# Patient Record
Sex: MALE | Race: WHITE | Employment: OTHER | URBAN - METROPOLITAN AREA
[De-identification: names, ages, dates, MRNs, and addresses within clinical notes are randomized per-mention and may not be internally consistent; named-entity substitution may affect disease eponyms.]

---

## 2017-01-03 ENCOUNTER — HOSPITAL ENCOUNTER (OUTPATIENT)
Dept: PHYSICAL THERAPY | Age: 72
Discharge: HOME | End: 2017-01-03
Payer: MEDICARE

## 2017-01-03 PROCEDURE — 97110 THERAPEUTIC EXERCISES: CPT

## 2017-01-03 PROCEDURE — 97112 NEUROMUSCULAR REEDUCATION: CPT

## 2017-01-06 ENCOUNTER — HOSPITAL ENCOUNTER (OUTPATIENT)
Dept: PHYSICAL THERAPY | Age: 72
Discharge: HOME | End: 2017-01-06
Payer: MEDICARE

## 2017-01-06 PROCEDURE — 97112 NEUROMUSCULAR REEDUCATION: CPT

## 2017-01-06 PROCEDURE — G8978 MOBILITY CURRENT STATUS: HCPCS

## 2017-01-06 PROCEDURE — G8979 MOBILITY GOAL STATUS: HCPCS

## 2017-01-06 PROCEDURE — 97110 THERAPEUTIC EXERCISES: CPT

## 2017-01-06 NOTE — PROGRESS NOTES
[facility]  [address]  [place], [address]  [phone] fax    Medicare Progress Report    Patient name: Rosales Burnett. Start of Care: 2016   Referral source: Sunil Stout MD : 1945   Medical/Treatment Diagnosis: Low back pain [M54.5]  Sciatica [M54.30] Onset Date:2016     Prior Hospitalization: see medical history Provider#: [id number]   Medications: Verified on Patient Summary List    Comorbidities: Visual impairments, HBP, heart disease  Prior Level of Function: Recreational walking and biking without pain  Visits from Start of Care: 8    Missed Visits: 0    Reporting Period: 2016 to 2016    Subjective Reports: \"Its doing better, but I still get a lot of pain in that hamstring (points to Right). It got me when I woke up this morning. \"    Short Term Goals: To be accomplished in 1 weeks:  1. Pt will report compliance with HEP for self-management of symptoms. -met per patient report. 16  Long Term Goals: To be accomplished in 4 weeks:  1. Pt will improve FOTO score to 60 to improve ease of ADLs. Met at 64 2016  2. Pt will demonstrate 90/90 HS tests B to less than 10 degrees to improve ease of dressing. (B) HS at popliteal angle: 27 deg (16); Not met 27/30 R/L 2017  3. Pt will improve B glut med and glut max strength to 4+/5 or better to improve ease of descending stairs. Progressed 4/5 MMT, 4+/5 MMT 2017  4. Pt will demonstrate proper TrA draw with PPT x10 independently to improve core strength and stability for ease of ADLs.  Met, 10\"x10 2016    Key functional changes: Hip ABD: R 4/5 MMT, L 4+/5 MMT  Hip extension: 4+/5 MMT Jacky     Decreased stability in SLS and SLS activity through R LE.  Added dynamic step ups.     90/90 HS: 30 degrees L, 27 R      Problems/ barriers to goal attainment: age, HEP compliance     Assessment / Recommendations: Pt progressing with stabilization interventions, but continues to demonstrate limited hip stability despite gains in hip strength and LE flexibility. Subjectively he reports improvement, but continues to report intermittent post R hamstring pain. Recommend continuing PT to address remaining impairments. Problem List: pain affecting function, decrease ROM, decrease strength, decrease ADL/ functional abilitiies, decrease activity tolerance and decrease flexibility/ joint mobility   Treatment Plan: Therapeutic exercise, Therapeutic activities, Neuromuscular re-education, Physical agent/modality, Manual therapy, Aquatic therapy, Patient education and Self Care training    Patient Goal (s) has been updated and includes: Regain confidence in leg's strength. Ride bike again     Updated Goals to be accomplished in 2 weeks:  Continue unmet goals    Frequency / Duration: Patient to be seen 2 times per week for 2 weeks:    G-Codes (GP)  Mobility  [id number] Current  CJ= 20-39%   Goal  CK= 40-59%    The severity rating is based on clinical judgment and the FOTO score.       Alena Gtz, PT, DPT, ATC, CSCS 1/6/2017 11:48 AM

## 2017-01-06 NOTE — PROGRESS NOTES
PT DAILY TREATMENT NOTE - [facility]     Patient Name: Marietta Stockton. Date:2017  : 1945  [x]  Patient  Verified  Payor: [place] MEDICARE / Plan: [place] MEDICARE PART A & B / Product Type: Medicare /    In time:9:30am  Out time:10:15am  Total Treatment Time (min): 45  Total Timed Codes (min): 45  1:1 Treatment Time ( [address] only): 44   Visit #: 8 of 8    Treatment Area: Low back pain [M54.5]  Sciatica [M54.30]    SUBJECTIVE  Pain Level (0-10 scale): 0  Any medication changes, allergies to medications, adverse drug reactions, diagnosis change, or new procedure performed?: [x] No    [] Yes (see summary sheet for update)  Subjective functional status/changes:   [] No changes reported  \"Its doing better, but I still get a lot of pain in that hamstring (points to Right). It got me when I woke up this morning. \"    OBJECTIVE    20 min Therapeutic Exercise:  [] See flow sheet :   Rationale: increase ROM and increase strength to improve the patients ability to improve ADL ease. 25 min Neuromuscular Re-education:  []  See flow sheet :   Rationale: increase strength, improve coordination and increase proprioception  to improve the patients ability to improve core stabilization during functional activity. With   [] TE   [] TA   [] neuro   [] other: Patient Education: [x] Review HEP    [] Progressed/Changed HEP based on:   [] positioning   [] body mechanics   [] transfers   [] heat/ice application    [] other:      Other Objective/Functional Measures:     Required frequent tactile cues to facilitate hip and knee flexion and avoid trunk compensation during lunges    Cues to reduce post pelvic tilt during seated chair interventions     Pain Level (0-10 scale) post treatment: 0    ASSESSMENT/Changes in Function: Pt progressing with stabilization interventions, but continues to demonstrate limited hip stability despite gains in hip strength and LE flexibility.  Subjectively he reports improvement, but continues to report intermittent post R hamstring pain. Recommend continuing PT to address remaining impairments. Patient will continue to benefit from skilled PT services to modify and progress therapeutic interventions, address functional mobility deficits, address ROM deficits, address strength deficits, analyze and address soft tissue restrictions, analyze and cue movement patterns, analyze and modify body mechanics/ergonomics, assess and modify postural abnormalities and instruct in home and community integration to attain remaining goals. []  See Plan of Care  [x]  See progress note/recertification  []  See Discharge Summary         Progress towards goals / Updated goals:  Short Term Goals: To be accomplished in 1 weeks:  1. Pt will report compliance with HEP for self-management of symptoms. -met per patient report. 12/14/16  Long Term Goals: To be accomplished in 4 weeks:  1. Pt will improve FOTO score to 60 to improve ease of ADLs. Met at 64 12/30/2016  2. Pt will demonstrate 90/90 HS tests B to less than 10 degrees to improve ease of dressing. (B) HS at popliteal angle: 27 deg (12/16/16); Not met 27/30 R/L 1/03/2017  3. Pt will improve B glut med and glut max strength to 4+/5 or better to improve ease of descending stairs. Progressed 4/5 MMT, 4+/5 MMT 1/03/2017  4. Pt will demonstrate proper TrA draw with PPT x10 independently to improve core strength and stability for ease of ADLs.   Met, 10\"x10 12/21/2016    PLAN  []  Upgrade activities as tolerated     [x]  Continue plan of care  []  Update interventions per flow sheet       []  Discharge due to:_  []  Other:_      Magdalena Webb, PT, DPT, ATC, CSCS 1/6/2017  9:52 AM    Future Appointments  Date Time Provider Jesús Casper   1/9/2017 2:30 PM Lindy Howard, PT [facility]   1/12/2017 8:30 AM Elizabeth Solis, PT [facility]   1/23/2017 8:30 AM Lindy Howard, PT [facility]   1/25/2017 8:30 AM Lindy Howard, PT [facility]

## 2017-01-09 ENCOUNTER — APPOINTMENT (OUTPATIENT)
Dept: PHYSICAL THERAPY | Age: 72
End: 2017-01-09
Payer: MEDICARE

## 2017-01-12 ENCOUNTER — HOSPITAL ENCOUNTER (OUTPATIENT)
Dept: PHYSICAL THERAPY | Age: 72
Discharge: HOME | End: 2017-01-12
Payer: MEDICARE

## 2017-01-12 PROCEDURE — 97112 NEUROMUSCULAR REEDUCATION: CPT

## 2017-01-12 PROCEDURE — 97110 THERAPEUTIC EXERCISES: CPT

## 2017-01-12 NOTE — PROGRESS NOTES
PT DAILY TREATMENT NOTE - [facility] 3-16    Patient Name: Diana Grover. Date:2017  : 1945  [x]  Patient  Verified  Payor: [place] / Plan: [place] MEDICARE PART A & B / Product Type: Medicare /    In [id number]  Out time:09  Total Treatment Time (min): 36  Total Timed Codes (min): 36  1:1 Treatment Time ( only): 24   Visit #: 1 of 4    Treatment Area: Low back pain [M54.5]  Sciatica [M54.30]    SUBJECTIVE  Pain Level (0-10 scale): 0  Any medication changes, allergies to medications, adverse drug reactions, diagnosis change, or new procedure performed?: [x] No    [] Yes (see summary sheet for update)  Subjective functional status/changes:   [] No changes reported  Pt reports seeing his MD, who prescribed something for nerve pain that he has not started taking. He reports his sciatica is gone, but his MD believes he has pain coming from a nerve in his lower back. OBJECTIVE     min []Eval                  []Re-Eval     10 min Therapeutic Exercise:  [x] See flow sheet :   Rationale: increase ROM and increase strength to improve the patients ability to perform ADLs    26 min Neuromuscular Re-education:  [x]  See flow sheet :   Rationale: increase strength, improve coordination and increase proprioception  to improve the patients ability to improve stability     With   [] TE   [] TA   [] neuro   [] other: Patient Education: [x] Review HEP    [] Progressed/Changed HEP based on:   [] positioning   [] body mechanics   [] transfers   [] heat/ice application    [] other:      Other Objective/Functional Measures: difficulty with BOSU lunge B, as well as with cyndi marches    Pain Level (0-10 scale) post treatment: 0    ASSESSMENT/Changes in Function:   Pt reports feeling more weakness in his R LE, demonstrating more shaking/fatigue with standing exercises.   He reports sciatic nerve symptoms have abolished, but that his doctor believes he still has a nerve issue from his lower back due to reflex testing in B LE.    Patient will continue to benefit from skilled PT services to modify and progress therapeutic interventions, address functional mobility deficits, address ROM deficits, address strength deficits, analyze and address soft tissue restrictions, analyze and cue movement patterns, analyze and modify body mechanics/ergonomics, assess and modify postural abnormalities and instruct in home and community integration to attain remaining goals. []  See Plan of Care  []  See progress note/recertification  []  See Discharge Summary         Progress towards goals / Updated goals:  Updated Goals to be accomplished in 2 weeks:  1. Pt will demonstrate 90/90 HS tests B to less than 10 degrees to improve ease of dressing. Not met, still very restricted 01/12/2017  2. Pt will improve B glut med and glut max strength to 4+/5 or better to improve ease of descending stairs.   Fatigues more quickly on R 01/12/2017    PLAN  [x]  Upgrade activities as tolerated     [x]  Continue plan of care  [x]  Update interventions per flow sheet       []  Discharge due to:_  []  Other:_      Elizabeth Solis, PT 1/12/2017  8:46 AM    Future Appointments  Date Time Provider Jesús Casper   1/23/2017 8:30 AM [facility]   1/25/2017 8:30 AM [facility]

## 2017-01-25 ENCOUNTER — HOSPITAL ENCOUNTER (OUTPATIENT)
Dept: PHYSICAL THERAPY | Age: 72
Discharge: HOME | End: 2017-01-25
Payer: MEDICARE

## 2017-01-25 PROCEDURE — 97112 NEUROMUSCULAR REEDUCATION: CPT

## 2017-01-25 PROCEDURE — 97110 THERAPEUTIC EXERCISES: CPT

## 2017-01-25 NOTE — PROGRESS NOTES
PT DAILY TREATMENT NOTE - [facility]     Patient Name: Jt Maher. Date:2017  : 1945  [x]  Patient  Verified  Payor: Neil Dear / Plan: [place] MEDICARE PART A & B / Product Type: Medicare /    In time:8:33  Out time:9:17  Total Treatment Time (min): 44  Total Timed Codes (min): 44  1:1 Treatment Time ([address] only): 40   Visit #: 2 of 4    Treatment Area: Low back pain [M54.5]  Sciatica [M54.30]    SUBJECTIVE  Pain Level (0-10 scale): 0/10  Any medication changes, allergies to medications, adverse drug reactions, diagnosis change, or new procedure performed?: [x] No    [] Yes (see summary sheet for update)  Subjective functional status/changes:   [] No changes reported  The patient states that he was carrying groceries up the stairs yesterday and noted feeling weaker through his right leg. No pain reported. OBJECTIVE   34 min Therapeutic Exercise:  [x] See flow sheet :   Rationale: increase ROM and increase strength to improve the patients ability to improve ADL ease. 10 min Neuromuscular Re-education:  [x]  See flow sheet : Bosu lunges, bosu step ups, pistol squats R LE. Rationale: increase ROM and increase strength  to improve the patients ability to improve ADL ease. With   [] TE   [] TA   [] neuro   [] other: Patient Education: [x] Review HEP    [] Progressed/Changed HEP based on:   [] positioning   [] body mechanics   [] transfers   [] heat/ice application    [] other:      Other Objective/Functional Measures: Added pistol squats with the patient requiring cues in order to perform correctly (nose over toes, with momentum recruitment). Cues for decreasing weight shift during quadruped. Pain Level (0-10 scale) post treatment: 0/10     ASSESSMENT/Changes in Function: Good progress with strengthening exercises. 1 additional visit with progression to D/C to HEP next visit.      Patient will continue to benefit from skilled PT services to modify and progress therapeutic interventions, address functional mobility deficits, address ROM deficits, address strength deficits, analyze and address soft tissue restrictions, analyze and cue movement patterns, analyze and modify body mechanics/ergonomics, assess and modify postural abnormalities and instruct in home and community integration to attain remaining goals. []  See Plan of Care  []  See progress note/recertification  []  See Discharge Summary         Progress towards goals / Updated goals:  Updated Goals to be accomplished in 2 weeks:  1. Pt will demonstrate 90/90 HS tests B to less than 10 degrees to improve ease of dressing. Not met, still very restricted 01/12/2017; Not met, but good progress with mid 20's 90/90 1/25/2017.   2. Pt will improve B glut med and glut max strength to 4+/5 or better to improve ease of descending stairs. Fatigues more quickly on R 01/12/2017    PLAN  []  Upgrade activities as tolerated     [x]  Continue plan of care    Wendy Holloway, PT 1/25/2017  8:42 AM    No future appointments.

## 2017-01-31 ENCOUNTER — HOSPITAL ENCOUNTER (OUTPATIENT)
Dept: PHYSICAL THERAPY | Age: 72
Discharge: HOME | End: 2017-01-31
Payer: MEDICARE

## 2017-01-31 PROCEDURE — 97112 NEUROMUSCULAR REEDUCATION: CPT

## 2017-01-31 PROCEDURE — G8980 MOBILITY D/C STATUS: HCPCS

## 2017-01-31 PROCEDURE — 97110 THERAPEUTIC EXERCISES: CPT

## 2017-01-31 PROCEDURE — G8979 MOBILITY GOAL STATUS: HCPCS

## 2017-01-31 NOTE — PROGRESS NOTES
PT DAILY TREATMENT NOTE - [facility]     Patient Name: Tristian Holt. Date:2017  : 1945  [x]  Patient  Verified  Payor: Michael Veliz / Plan: [place] MEDICARE PART A & B / Product Type: Medicare /    In time:9:00  Out time:9:50  Total Treatment Time (min): 50  Total Timed Codes (min): 50  1:1 Treatment Time ( only): 25  Visit #: 3 of 4     Treatment Area: Low back pain [M54.5]  Sciatica [M54.30]     SUBJECTIVE  Pain Level (0-10 scale): 0/10  Any medication changes, allergies to medications, adverse drug reactions, diagnosis change, or new procedure performed?: [x] No    [] Yes (see summary sheet for update)  Subjective functional status/changes:   [] No changes reported  The patient states he does feel like he is doing better and that he improved with Physical Therapy. OBJECTIVE  40 min Therapeutic Exercise:  [x] See flow sheet :   Rationale: increase ROM and increase strength to improve the patients ability to improve ADL ease. 10 min Neuromuscular Re-education:  [x]  See flow sheet :    Rationale: increase ROM and increase strength  to improve the patients ability to improve ADL ease. With   [] TE   [] TA   [] neuro   [] other: Patient Education: [x] Review HEP    [] Progressed/Changed HEP based on:   [] positioning   [] body mechanics   [] transfers   [] heat/ice application    [] other:      Other Objective/Functional Measures:   Hip extension: 4/5 MMT      Pain Level (0-10 scale) post treatment: 0/10    ASSESSMENT/Changes in Function: Good progress with PT. He has reached plateau with PT, noted improvements with hip strength. His HEP has been updated and he has been discharged to [facility]. []  See Plan of Care  []  See progress note/recertification  [x]  See Discharge Summary         Progress towards goals / Updated goals:  Updated Goals to be accomplished in 2 weeks:  1. Pt will demonstrate 90/90 HS tests B to less than 10 degrees to improve ease of dressing.  Not met, still very restricted 01/12/2017; Not met, but good progress with mid 20's 90/90 1/25/2017.   2. Pt will improve B glut med and glut max strength to 4+/5 or better to improve ease of descending stairs. Fatigues more quickly on R 01/12/2017; Progressed to 4/5 MMT 1/31/2017    PLAN  []  Upgrade activities as tolerated     []  Continue plan of care  []  Update interventions per flow sheet       [x]  Discharge due to:_  []  Other:_      Alayne Osler, PT 1/31/2017  10:39 AM    No future appointments.

## 2017-02-02 NOTE — PROGRESS NOTES
[facility]  [address]  [place], [address]  [phone] fax    Physical Therapy Discharge Summary    Patient name: Maliha Alejandre. Start of Care: 2016   Referral source: Richi Moffett MD : 1945   Medical/Treatment Diagnosis: Low back pain [M54.5]  Sciatica [M54.30] Onset Date:2016   Prior Hospitalization: see medical history Provider#: [id number]   Medications: Verified on Patient Summary List     Comorbidities: Visual impairments, HBP, heart disease  Prior Level of Function: Recreational walking and biking without pain  Visits from Start of Care: 11    Missed Visits: 0  Reporting Period : 2017 to 2017    Summary of Care:  1. Pt will demonstrate 90/90 HS tests B to less than 10 degrees to improve ease of dressing. Not met, still very restricted 2017; Not met, but good progress with mid 20's 90/90 2017.   2. Pt will improve B glut med and glut max strength to 4+/5 or better to improve ease of descending stairs. Fatigues more quickly on R 2017; Progressed to 4/5 MMT 2017    Good progress with PT. He has reached plateau with PT, noted improvements with hip strength. His HEP has been updated and he has been discharged to [facility]. G-Codes (GP)  Mobility   [id number] Goal  CK= 40-59%  D/C  CJ= 20-39%    The severity rating is based on clinical judgment and the FOTO score.     ASSESSMENT/RECOMMENDATIONS:  [x]Discontinue therapy: [x]Patient has reached or is progressing toward set goals      []Patient is non-compliant or has abdicated      []Due to lack of appreciable progress towards set [address], PT 2017 6:35 PM

## 2017-02-08 ENCOUNTER — HOSPITAL ENCOUNTER (OUTPATIENT)
Age: 72
Discharge: HOME | End: 2017-02-08
Attending: INTERNAL MEDICINE
Payer: MEDICARE

## 2017-02-08 DIAGNOSIS — M79.604 PAIN OF RIGHT LEG: ICD-10-CM

## 2017-02-08 PROCEDURE — 72148 MRI LUMBAR SPINE W/O DYE: CPT

## 2017-02-14 ENCOUNTER — OFFICE VISIT (OUTPATIENT)
Dept: ORTHOPEDIC SURGERY | Age: 72
End: 2017-02-14

## 2017-02-14 VITALS
WEIGHT: 219 LBS | TEMPERATURE: 97.8 F | HEART RATE: 75 BPM | DIASTOLIC BLOOD PRESSURE: 76 MMHG | BODY MASS INDEX: 30.66 KG/M2 | OXYGEN SATURATION: 98 % | SYSTOLIC BLOOD PRESSURE: 144 MMHG | HEIGHT: 71 IN | RESPIRATION RATE: 18 BRPM

## 2017-02-14 DIAGNOSIS — M79.2 NEURITIS: ICD-10-CM

## 2017-02-14 DIAGNOSIS — N28.1 BILATERAL RENAL CYSTS: ICD-10-CM

## 2017-02-14 DIAGNOSIS — M47.816 LUMBAR FACET ARTHROPATHY: ICD-10-CM

## 2017-02-14 DIAGNOSIS — M48.061 LUMBAR SPINAL STENOSIS: Primary | ICD-10-CM

## 2017-02-14 RX ORDER — GLUCOSAM/CHONDRO/HERB 149/HYAL 750-100 MG
2000 TABLET ORAL
COMMUNITY
End: 2018-11-13

## 2017-02-14 RX ORDER — GLUCOSAMINE SULFATE 1500 MG
POWDER IN PACKET (EA) ORAL DAILY
COMMUNITY
End: 2021-05-11

## 2017-02-14 RX ORDER — LOSARTAN POTASSIUM 100 MG/1
TABLET ORAL
COMMUNITY
Start: 2016-12-22 | End: 2022-03-01 | Stop reason: SDUPTHER

## 2017-02-14 RX ORDER — ROSUVASTATIN CALCIUM 10 MG/1
10 TABLET, FILM COATED ORAL DAILY
COMMUNITY
Start: 2016-12-01 | End: 2021-08-30 | Stop reason: SDUPTHER

## 2017-02-14 RX ORDER — GABAPENTIN 100 MG/1
100 CAPSULE ORAL
COMMUNITY
Start: 2017-01-11 | End: 2019-11-26

## 2017-02-14 RX ORDER — BISMUTH SUBSALICYLATE 262 MG
1 TABLET,CHEWABLE ORAL DAILY
COMMUNITY
End: 2018-11-13

## 2017-02-14 NOTE — PATIENT INSTRUCTIONS
Low Back Arthritis: Exercises  Your Care Instructions  Here are some examples of typical rehabilitation exercises for your condition. Start each exercise slowly. Ease off the exercise if you start to have pain. Your doctor or physical therapist will tell you when you can start these exercises and which ones will work best for you. When you are not being active, find a comfortable position for rest. Some people are comfortable on the floor or a medium-firm bed with a small pillow under their head and another under their knees. Some people prefer to lie on their side with a pillow between their knees. Don't stay in one position for too long. Take short walks (10 to 20 minutes) every 2 to 3 hours. Avoid slopes, hills, and stairs until you feel better. Walk only distances you can manage without pain, especially leg pain. How to do the exercises  Pelvic tilt    1. Lie on your back with your knees bent. 2. \"Brace\" your stomach--tighten your muscles by pulling in and imagining your belly button moving toward your spine. 3. Press your lower back into the floor. You should feel your hips and pelvis rock back. 4. Hold for 6 seconds while breathing smoothly. 5. Relax and allow your pelvis and hips to rock forward. 6. Repeat 8 to 12 times. Back stretches    1. Get down on your hands and knees on the floor. 2. Relax your head and allow it to droop. Round your back up toward the ceiling until you feel a nice stretch in your upper, middle, and lower back. Hold this stretch for as long as it feels comfortable, or about 15 to 30 seconds. 3. Return to the starting position with a flat back while you are on your hands and knees. 4. Let your back sway by pressing your stomach toward the floor. Lift your buttocks toward the ceiling. 5. Hold this position for 15 to 30 seconds. 6. Repeat 2 to 4 times. Follow-up care is a key part of your treatment and safety.  Be sure to make and go to all appointments, and call your doctor if you are having problems. It's also a good idea to know your test results and keep a list of the medicines you take. Where can you learn more? Go to http://ty-cyrus.info/. Enter O876 in the search box to learn more about \"Low Back Arthritis: Exercises. \"  Current as of: May 23, 2016  Content Version: 11.1  © 1643-6134 [facility]. Care instructions adapted under license by [employer] (which disclaims liability or warranty for this information). If you have questions about a medical condition or this instruction, always ask your healthcare professional. [address] any warranty or liability for your use of this information.
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
## 2018-07-19 ENCOUNTER — HOSPITAL ENCOUNTER (OUTPATIENT)
Dept: LAB | Age: 73
Discharge: HOME | End: 2018-07-19
Payer: MEDICARE

## 2018-07-19 LAB
ALBUMIN SERPL-MCNC: 3.6 G/DL (ref 3.4–5)
ALBUMIN/GLOB SERPL: 1.1 {RATIO} (ref 0.8–1.7)
ALP SERPL-CCNC: 69 U/L (ref 45–117)
ALT SERPL-CCNC: 23 U/L (ref 16–61)
ANION GAP SERPL CALC-SCNC: 7 MMOL/L (ref 3–18)
APPEARANCE UR: CLEAR
AST SERPL-CCNC: 14 U/L (ref 15–37)
BASOPHILS # BLD: 0 K/UL (ref 0–0.1)
BASOPHILS NFR BLD: 1 % (ref 0–2)
BILIRUB SERPL-MCNC: 0.5 MG/DL (ref 0.2–1)
BILIRUB UR QL: NEGATIVE
BUN SERPL-MCNC: 18 MG/DL (ref 7–18)
BUN/CREAT SERPL: 17 (ref 12–20)
CALCIUM SERPL-MCNC: 8 MG/DL (ref 8.5–10.1)
CHLORIDE SERPL-SCNC: 105 MMOL/L (ref 100–108)
CHOLEST SERPL-MCNC: 167 MG/DL
CO2 SERPL-SCNC: 29 MMOL/L (ref 21–32)
COLOR UR: YELLOW
CREAT SERPL-MCNC: 1.03 MG/DL (ref 0.6–1.3)
DIFFERENTIAL METHOD BLD: ABNORMAL
EOSINOPHIL # BLD: 0.5 K/UL (ref 0–0.4)
EOSINOPHIL NFR BLD: 12 % (ref 0–5)
EPITH CASTS URNS QL MICRO: NEGATIVE /LPF (ref 0–5)
ERYTHROCYTE [DISTWIDTH] IN BLOOD BY AUTOMATED COUNT: 12.3 % (ref 11.6–14.5)
GLOBULIN SER CALC-MCNC: 3.2 G/DL (ref 2–4)
GLUCOSE SERPL-MCNC: 102 MG/DL (ref 74–99)
GLUCOSE UR STRIP.AUTO-MCNC: NEGATIVE MG/DL
HCT VFR BLD AUTO: 40.3 % (ref 36–48)
HDLC SERPL-MCNC: 84 MG/DL (ref 40–60)
HDLC SERPL: 2 {RATIO} (ref 0–5)
HGB BLD-MCNC: 14.2 G/DL (ref 13–16)
HGB UR QL STRIP: ABNORMAL
KETONES UR QL STRIP.AUTO: NEGATIVE MG/DL
LDLC SERPL CALC-MCNC: 60.2 MG/DL (ref 0–100)
LEUKOCYTE ESTERASE UR QL STRIP.AUTO: ABNORMAL
LIPID PROFILE,FLP: ABNORMAL
LYMPHOCYTES # BLD: 1.3 K/UL (ref 0.9–3.6)
LYMPHOCYTES NFR BLD: 30 % (ref 21–52)
MCH RBC QN AUTO: 32.5 PG (ref 24–34)
MCHC RBC AUTO-ENTMCNC: 35.2 G/DL (ref 31–37)
MCV RBC AUTO: 92.2 FL (ref 74–97)
MONOCYTES # BLD: 0.4 K/UL (ref 0.05–1.2)
MONOCYTES NFR BLD: 10 % (ref 3–10)
NEUTS SEG # BLD: 2 K/UL (ref 1.8–8)
NEUTS SEG NFR BLD: 47 % (ref 40–73)
NITRITE UR QL STRIP.AUTO: NEGATIVE
PH UR STRIP: 5 [PH] (ref 5–8)
PLATELET # BLD AUTO: 180 K/UL (ref 135–420)
PMV BLD AUTO: 10.1 FL (ref 9.2–11.8)
POTASSIUM SERPL-SCNC: 4.2 MMOL/L (ref 3.5–5.5)
PROT SERPL-MCNC: 6.8 G/DL (ref 6.4–8.2)
PROT UR STRIP-MCNC: NEGATIVE MG/DL
PSA SERPL-MCNC: 1.6 NG/ML (ref 0–4)
RBC # BLD AUTO: 4.37 M/UL (ref 4.7–5.5)
RBC #/AREA URNS HPF: NORMAL /HPF (ref 0–5)
SODIUM SERPL-SCNC: 141 MMOL/L (ref 136–145)
SP GR UR REFRACTOMETRY: 1.02 (ref 1–1.03)
TRIGL SERPL-MCNC: 114 MG/DL (ref ?–150)
UROBILINOGEN UR QL STRIP.AUTO: 1 EU/DL (ref 0.2–1)
VLDLC SERPL CALC-MCNC: 22.8 MG/DL
WBC # BLD AUTO: 4.2 K/UL (ref 4.6–13.2)
WBC URNS QL MICRO: NORMAL /HPF (ref 0–4)

## 2018-07-19 PROCEDURE — 81001 URINALYSIS AUTO W/SCOPE: CPT | Performed by: INTERNAL MEDICINE

## 2018-07-19 PROCEDURE — 80053 COMPREHEN METABOLIC PANEL: CPT | Performed by: INTERNAL MEDICINE

## 2018-07-19 PROCEDURE — 85025 COMPLETE CBC W/AUTO DIFF WBC: CPT | Performed by: INTERNAL MEDICINE

## 2018-07-19 PROCEDURE — 80061 LIPID PANEL: CPT | Performed by: INTERNAL MEDICINE

## 2018-07-19 PROCEDURE — 36415 COLL VENOUS BLD VENIPUNCTURE: CPT | Performed by: INTERNAL MEDICINE

## 2018-07-19 PROCEDURE — 84153 ASSAY OF PSA TOTAL: CPT | Performed by: INTERNAL MEDICINE

## 2018-08-15 ENCOUNTER — NURSE NAVIGATOR (OUTPATIENT)
Dept: OTHER | Age: 73
End: 2018-08-15

## 2018-08-15 NOTE — NURSE NAVIGATOR
Referring Provider: Giselle Valle MD      Lung Cancer Risk Profile:   Age: 75  Gender: Male  Height: 70.5\"  Weight: 210#    Smoking History:  Smoking Status: past use  # years smokin years smoking a pipe  # years quit: 25  Packs/day: N/A  Pack years: N/A    Patient discussed smoking cessation with PCP: Unknown    Patient participated in shared decision making process with PCP: Unknown    Patient is currently experiencing symptoms: No, per patient report    If yes what symptoms:     Co-Morbidities:      Cancer History:  Basal cell    Additional Risk Factors:    Exposure to asbestos and agent orange      Pt does not meet screening criteria because he did not smoke cigarettes and he quit pipe smoking 25 years ago (established criteria states must be current smoker or have quit within 15 years). Pt notified that he does not meet criteria, he has been advised of the $199 self-pay option and he does want to self-pay. Mr. Андрей Oshea is aware that payment will be due at the time of the procedure. Dori Jones, MAIKELN, RN, [id number] [facility] Nurse Navigator

## 2018-08-16 ENCOUNTER — HOSPITAL ENCOUNTER (OUTPATIENT)
Dept: CT IMAGING | Age: 73
Discharge: HOME | End: 2018-08-16
Attending: INTERNAL MEDICINE
Payer: SELF-PAY

## 2018-08-16 VITALS — BODY MASS INDEX: 30.35 KG/M2 | HEIGHT: 70 IN | WEIGHT: 212 LBS

## 2018-08-16 DIAGNOSIS — Z87.891 PERSONAL HISTORY OF TOBACCO USE, PRESENTING HAZARDS TO HEALTH: ICD-10-CM

## 2018-08-16 PROCEDURE — G0297 LDCT FOR LUNG CA SCREEN: HCPCS

## 2018-08-27 PROBLEM — R31.9 HEMATURIA: Status: ACTIVE | Noted: 2018-08-27

## 2018-08-27 PROBLEM — E29.1 HYPOGONADISM IN MALE: Status: ACTIVE | Noted: 2018-08-27

## 2019-01-12 ENCOUNTER — HOSPITAL ENCOUNTER (OUTPATIENT)
Dept: LAB | Age: 74
Discharge: HOME | End: 2019-01-12
Payer: MEDICARE

## 2019-01-12 LAB
ALBUMIN SERPL-MCNC: 3.9 G/DL (ref 3.4–5)
ALBUMIN/GLOB SERPL: 1.3 {RATIO} (ref 0.8–1.7)
ALP SERPL-CCNC: 75 U/L (ref 45–117)
ALT SERPL-CCNC: 31 U/L (ref 16–61)
ANION GAP SERPL CALC-SCNC: 6 MMOL/L (ref 3–18)
AST SERPL-CCNC: 29 U/L (ref 15–37)
BILIRUB SERPL-MCNC: 0.7 MG/DL (ref 0.2–1)
BUN SERPL-MCNC: 13 MG/DL (ref 7–18)
BUN/CREAT SERPL: 12 (ref 12–20)
CALCIUM SERPL-MCNC: 8.5 MG/DL (ref 8.5–10.1)
CHLORIDE SERPL-SCNC: 106 MMOL/L (ref 100–108)
CHOLEST SERPL-MCNC: 180 MG/DL
CO2 SERPL-SCNC: 29 MMOL/L (ref 21–32)
CREAT SERPL-MCNC: 1.07 MG/DL (ref 0.6–1.3)
GLOBULIN SER CALC-MCNC: 2.9 G/DL (ref 2–4)
GLUCOSE SERPL-MCNC: 99 MG/DL (ref 74–99)
HDLC SERPL-MCNC: 117 MG/DL (ref 40–60)
HDLC SERPL: 1.5 {RATIO} (ref 0–5)
LDLC SERPL CALC-MCNC: 46.2 MG/DL (ref 0–100)
LIPID PROFILE,FLP: ABNORMAL
POTASSIUM SERPL-SCNC: 4.3 MMOL/L (ref 3.5–5.5)
PROT SERPL-MCNC: 6.8 G/DL (ref 6.4–8.2)
SODIUM SERPL-SCNC: 141 MMOL/L (ref 136–145)
TRIGL SERPL-MCNC: 84 MG/DL (ref ?–150)
VLDLC SERPL CALC-MCNC: 16.8 MG/DL

## 2019-01-12 PROCEDURE — 80061 LIPID PANEL: CPT

## 2019-01-12 PROCEDURE — 80053 COMPREHEN METABOLIC PANEL: CPT

## 2019-01-12 PROCEDURE — 36415 COLL VENOUS BLD VENIPUNCTURE: CPT

## 2019-06-27 ENCOUNTER — HOSPITAL ENCOUNTER (OUTPATIENT)
Age: 74
Discharge: HOME | End: 2019-06-27
Attending: INTERNAL MEDICINE
Payer: MEDICARE

## 2019-06-27 DIAGNOSIS — M25.562 LEFT KNEE PAIN: ICD-10-CM

## 2019-06-27 PROCEDURE — 73721 MRI JNT OF LWR EXTRE W/O DYE: CPT

## 2020-02-13 LAB
CREATININE, EXTERNAL: 1.05
LDL-C, EXTERNAL: 65

## 2020-10-02 ENCOUNTER — HOSPITAL ENCOUNTER (OUTPATIENT)
Dept: PHYSICAL THERAPY | Age: 75
Discharge: HOME | End: 2020-10-02
Payer: MEDICARE

## 2020-10-02 PROCEDURE — 97110 THERAPEUTIC EXERCISES: CPT

## 2020-10-02 PROCEDURE — 97535 SELF CARE MNGMENT TRAINING: CPT

## 2020-10-02 PROCEDURE — 97162 PT EVAL MOD COMPLEX 30 MIN: CPT

## 2020-10-02 NOTE — PROGRESS NOTES
PT DAILY TREATMENT NOTE 10-18    Patient Name: Sandrita Leung  Date:10/2/2020  : 1945  [x]  Patient  Verified  Payor: [place] MEDICARE / Plan: [place] MEDICARE PART A & B / Product Type: Medicare /    In time:1045  Out time:1130  Total Treatment Time (min): 45  Visit #: 1 of 8    Medicare/BCBS Only   Total Timed Codes (min):  25 1:1 Treatment Time:  45       Treatment Area: Pain in left knee [M25.562]    SUBJECTIVE  Pain Level (0-10 scale): 3  Any medication changes, allergies to medications, adverse drug reactions, diagnosis change, or new procedure performed?: [x] No    [] Yes (see summary sheet for update)  Subjective functional status/changes:   [] No changes reported       OBJECTIVE    Modality rationale:    Min Type Additional Details    [] Estim:  []Unatt       []IFC  []Premod                        []Other:  []w/ice   []w/heat  Position:  Location:    [] Estim: []Att    []TENS instruct  []NMES                    []Other:  []w/US   []w/ice   []w/heat  Position:  Location:    []  Traction: [] Cervical       []Lumbar                       [] Prone          []Supine                       []Intermittent   []Continuous Lbs:  [] before manual  [] after manual    []  Ultrasound: []Continuous   [] Pulsed                           []1MHz   []3MHz W/cm2:  Location:    []  Iontophoresis with dexamethasone         Location: [] Take home patch   [] In clinic    []  Ice     []  heat  []  Ice massage  []  Laser   []  Anodyne Position:  Location:    []  Laser with stim  []  Other:  Position:  Location:    []  Vasopneumatic Device Pressure:       [] lo [] med [] hi   Temperature: [] lo [] med [] hi   [] Skin assessment post-treatment:  []intact []redness- no adverse reaction    []redness  adverse reaction:     20 min [x]Eval                  []Re-Eval       15 min Therapeutic Exercise:  [] See flow sheet : HEP   Rationale: increase ROM and increase strength to improve the patients ability to perform functional tasks.      10 min Self Care/Home Management: Fall prevention- home safety to prevent falls   Rationale: decrease fall contributors  to improve the patients ability to perform ADL            With   [] TE   [] TA   [] neuro   [] other: Patient Education: [x] Review HEP    [] Progressed/Changed HEP based on:   [] positioning   [] body mechanics   [] transfers   [] heat/ice application    [] other:      Other Objective/Functional Measures:       Pain Level (0-10 scale) post treatment: 3    ASSESSMENT/Changes in Function:      Patient will continue to benefit from skilled PT services to modify and progress therapeutic interventions, address functional mobility deficits, address ROM deficits, address strength deficits, analyze and address soft tissue restrictions, analyze and cue movement patterns and address imbalance/dizziness to attain remaining goals. [x]  See Plan of Care  []  See progress note/recertification  []  See Discharge Summary         Progress towards goals / Updated goals:  Short Term Goals: To be accomplished in 1 weeks:   1. The pt will be I and compliant with HEP   IE- issued HEP     Long Term Goals: To be accomplished in 4 weeks:   1. Improve left knee extension to lacking 5 degrees or less for improved ability for daily tasks. IE- lacking 8 degrees   2. Improve left quad MMT to 4+/5 to improve ability for standing and daily tasks. IE- 4/5   3. Improve left glute med MMT to 4/5 to improve ability for gait   IE- 4-/5   4. The pt will report a little difficulty with performing light activities at home. IE- moderate   5. Improve Romberg test with EC to 30 sec for decreased fall risk with ADL   IE- 8 sec.        PLAN  []  Upgrade activities as tolerated     [x]  Continue plan of care  []  Update interventions per flow sheet       []  Discharge due to:_  []  Other:_      Zeferino Seymour PT 10/2/2020  11:43 AM    Future Appointments   Date Time Provider Jesús Casper   10/7/2020  8:30 AM Hi Gabriel S, PT [facility]   10/13/2020 10:45 AM Rusty Mcelroy, PT [facility]   10/16/2020 10:00 AM Rusty Mcelroy, PT [facility]   10/20/2020  9:45 AM Abena Vázquez, PT [facility]   10/23/2020  9:15 AM Rusty Mcelroy, PT [facility]   10/27/2020  9:15 AM Rusty Mcelroy, PT [facility]   10/29/2020 10:00 AM Rusty Mcelroy, PT [facility]   11/16/2020 10:30 AM [facility] NURSE [facility]   12/2/2020  2:30 PM Andreas Chen MD [facility]

## 2020-10-02 NOTE — PROGRESS NOTES
[facility]  [address]  [place], [address]  [phone] fax    Plan of Care/ Statement of Necessity for Physical Therapy Services    Patient name: Shanel Wylie Start of Care: 10/2/2020   Referral source: Peter Barrios MD : 1945    Medical Diagnosis: Pain in left knee [M25.562]  Payor: VA MEDICARE / Plan: VA MEDICARE PART A & B / Product Type: Medicare /  Onset Date:    Treatment Diagnosis: left knee pain   Prior Hospitalization: see medical history Provider#: [id number]   Medications: Verified on Patient summary List    Comorbidities: heart disease, OA, HTN   Prior Level of Function: functionally I with daily activities      The Plan of Care and following information is based on the information from the initial evaluation. Assessment/ key information: 68 y/o male presents with c/o left knee pain that has gradually worsened since 2019. He reports while getting out of the car he twisted his knee and had significant pain. He also has c/o decreased balance with standing and gait. The pt demonstrates unsteady gait pattern with increased MARGARET and decreased B step length. Left knee AROM lacks 8 degrees of extension to 122 degrees of flexion. + tenderness is noted over the medial joint line with palpation. MMT reveals weakness in B LEs with left knee being weaker than the right. Decreased flexibility is noted throughout B LEs. The pt will benefit from PT to address the aforementioned impairments. Evaluation Complexity History MEDIUM  Complexity : 1-2 comorbidities / personal factors will impact the outcome/ POC ; Examination MEDIUM Complexity : 3 Standardized tests and measures addressing body structure, function, activity limitation and / or participation in recreation  ;Presentation MEDIUM Complexity : Evolving with changing characteristics  ; Clinical Decision Making MEDIUM Complexity : FOTO score of 26-74  Overall Complexity Rating: MEDIUM  Problem List: pain affecting function, decrease ROM, decrease strength, impaired gait/ balance, decrease ADL/ functional abilitiies, decrease activity tolerance and decrease flexibility/ joint mobility   Treatment Plan may include any combination of the following: Therapeutic exercise, Therapeutic activities, Neuromuscular re-education, Physical agent/modality, Gait/balance training, Manual therapy, Aquatic therapy, Patient education and Self Care training  Patient / Family readiness to learn indicated by: asking questions, trying to perform skills and interest  Persons(s) to be included in education: patient (P)  Barriers to Learning/Limitations: None  Patient Goal (s): To strengthen the left leg before surgery in [address] [place] [address]  Patient Self Reported Health Status: good  Rehabilitation Potential: good    Short Term Goals: To be accomplished in 1 weeks:   1. The pt will be I and compliant with HEP    Long Term Goals: To be accomplished in 4 weeks:   1. Improve left knee extension to lacking 5 degrees or less for improved ability for daily tasks. 2. Improve left quad MMT to 4+/5 to improve ability for standing and daily tasks. 3. Improve left glute med MMT to 4/5 to improve ability for gait   4. The pt will report a little difficulty with performing light activities at home. 5. Improve Romberg test with EC to 30 sec for decreased fall risk with ADL        Frequency / Duration: Patient to be seen 2 times per week for 4 weeks. Patient/ Caregiver education and instruction: Diagnosis, prognosis, self care, activity modification and exercises   [x]  Plan of care has been reviewed with PTA    Certification Period: 10-02-20 to 10-31-20  Valli Favre, PT 10/2/2020 11:44 AM    ________________________________________________________________________    I certify that the above Therapy Services are being furnished while the patient is under my care.  I agree with the treatment plan and certify that this therapy is necessary.     [de-identified] Signature:____________________  Date:____________Time: _________    Please sign and return to [facility] [address] [place] [address] & [facility]  [address]  [place], [facility] [address]  [phone] fax

## 2020-10-07 ENCOUNTER — HOSPITAL ENCOUNTER (OUTPATIENT)
Dept: PHYSICAL THERAPY | Age: 75
Discharge: HOME | End: 2020-10-07
Payer: MEDICARE

## 2020-10-07 PROCEDURE — 97112 NEUROMUSCULAR REEDUCATION: CPT

## 2020-10-07 PROCEDURE — 97110 THERAPEUTIC EXERCISES: CPT

## 2020-10-07 NOTE — PROGRESS NOTES
PT DAILY TREATMENT NOTE 10-18    Patient Name: Devon Ruelas  Date:10/7/2020  : 1945  [x]  Patient  Verified  Payor: VA MEDICARE / Plan: VA MEDICARE PART A & B / Product Type: Medicare /    In time: 8:30  Out time:9:20  Total Treatment Time (min): 50  Visit #: 2 of 8    Medicare/BCBS Only   Total Timed Codes (min):  50 1:1 Treatment Time:  50       Treatment Area: Pain in left knee [M25.562]    SUBJECTIVE  Pain Level (0-10 scale): 0/10  Any medication changes, allergies to medications, adverse drug reactions, diagnosis change, or new procedure performed?: [x] No    [] Yes (see summary sheet for update)  Subjective functional status/changes:   [] No changes reported  The patient denies knee pain upon arrival, but has concern regarding his balance. OBJECTIVE  30 min Therapeutic Exercise:  [] See flow sheet :   Rationale: increase ROM and increase strength to improve the patients ability to improve ADL ease. 20 min Neuromuscular Re-education:  [x]  See flow sheet :    Rationale: improve coordination, improve balance and increase proprioception  to improve the patients ability to improve ADL ease. With   [] TE   [] TA   [] neuro   [] other: Patient Education: [x] Review HEP    [] Progressed/Changed HEP based on:   [] positioning   [] body mechanics   [] transfers   [] heat/ice application    [] other:      Other Objective/Functional Measures: The patient demonstrates limited confidence regarding weight shifting and acceptance of weight onto left LE. Notable wide base of support preferred during ambulation. He was challenged by modified tandem walking and did require cues for retro walking in order take longer strides. Pain Level (0-10 scale) post treatment: 0/10    ASSESSMENT/Changes in Function: No pain was expressed throughout duration of treatment. Opted to hold progression of HEP at this time until patient response to treatment is fully ascertained.  The patient is agreeable to this and leaves in no apparent distress. Patient will continue to benefit from skilled PT services to modify and progress therapeutic interventions, address functional mobility deficits, address ROM deficits, address strength deficits, analyze and address soft tissue restrictions, analyze and cue movement patterns, analyze and modify body mechanics/ergonomics, assess and modify postural abnormalities and instruct in home and community integration to attain remaining goals. []  See Plan of Care  []  See progress note/recertification  []  See Discharge Summary         Progress towards goals / Updated goals:  Short Term Goals: To be accomplished in 1 weeks:              1. The pt will be I and compliant with HEP              IE- issued HEP              Current: The patient reports compliance with HEP. 10/07/2020  Long Term Goals: To be accomplished in 4 weeks:              1. Improve left knee extension to lacking 5 degrees or less for improved ability for daily tasks. IE- lacking 8 degrees              2. Improve left quad MMT to 4+/5 to improve ability for standing and daily tasks. IE- 4/5              3. Improve left glute med MMT to 4/5 to improve ability for gait              IE- 4-/5              4. The pt will report a little difficulty with performing light activities at home. IE- moderate              5. Improve Romberg test with EC to 30 sec for decreased fall risk with ADL              IE- 8 sec.      PLAN  []  Upgrade activities as tolerated     [x]  Continue plan of care  []  Update interventions per flow sheet       []  Discharge due to:_  []  Other:_      Lynne Mahan, PT 10/7/2020  8:42 AM    Future Appointments   Date Time Provider Jesús Casper   10/13/2020 10:45 AM Rusty Mcelroy, PT [facility]   10/16/2020 10:00 AM Rusty Mcelroy, PT [facility]   10/20/2020  9:45 AM Stan Mercedes, PT [facility]   10/23/2020  9:15 AM Rusty Mcelroy, PT [facility]   10/27/2020  9:15 AM Noemi Fox, PT [facility]   10/29/2020 10:00 AM Noemi Fox, PT [facility]   11/16/2020 10:30 AM [facility] NURSE [facility]   12/2/2020  2:30 PM Beth Collins MD [facility] Open[facility]

## 2020-10-13 ENCOUNTER — HOSPITAL ENCOUNTER (OUTPATIENT)
Dept: PHYSICAL THERAPY | Age: 75
Discharge: HOME | End: 2020-10-13
Payer: MEDICARE

## 2020-10-13 PROCEDURE — 97110 THERAPEUTIC EXERCISES: CPT

## 2020-10-13 PROCEDURE — 97112 NEUROMUSCULAR REEDUCATION: CPT

## 2020-10-13 NOTE — PROGRESS NOTES
PT DAILY TREATMENT NOTE 10-18    Patient Name: Annette Lopez  Date:10/13/2020  : 1945  [x]  Patient  Verified  Payor: [place] MEDICARE / Plan: [place] MEDICARE PART A & B / Product Type: Medicare /    In time:1045  Out time:1135  Total Treatment Time (min): 50  Visit #: 3 of 8    Medicare/BCBS Only   Total Timed Codes (min):  50 1:1 Treatment Time:  50       Treatment Area: Pain in left knee [M25.562]    SUBJECTIVE  Pain Level (0-10 scale): 0  Any medication changes, allergies to medications, adverse drug reactions, diagnosis change, or new procedure performed?: [x] No    [] Yes (see summary sheet for update)  Subjective functional status/changes:   [] No changes reported  No complaints,  He reports he did well after last session.      OBJECTIVE    Modality rationale:    Min Type Additional Details    [] Estim:  []Unatt       []IFC  []Premod                        []Other:  []w/ice   []w/heat  Position:  Location:    [] Estim: []Att    []TENS instruct  []NMES                    []Other:  []w/US   []w/ice   []w/heat  Position:  Location:    []  Traction: [] Cervical       []Lumbar                       [] Prone          []Supine                       []Intermittent   []Continuous Lbs:  [] before manual  [] after manual    []  Ultrasound: []Continuous   [] Pulsed                           []1MHz   []3MHz W/cm2:  Location:    []  Iontophoresis with dexamethasone         Location: [] Take home patch   [] In clinic    []  Ice     []  heat  []  Ice massage  []  Laser   []  Anodyne Position:  Location:    []  Laser with stim  []  Other:  Position:  Location:    []  Vasopneumatic Device Pressure:       [] lo [] med [] hi   Temperature: [] lo [] med [] hi   [] Skin assessment post-treatment:  []intact []redness- no adverse reaction    []redness  adverse reaction:         40 min Therapeutic Exercise:  [x] See flow sheet :   Rationale: increase ROM and increase strength to improve the patients ability to perform ADL    10 min Neuromuscular Re-education:  [x]  See flow sheet : dynamic gait activities MSR   Rationale: improve coordination and improve balance  to improve the patients ability to perform ADL              With   [] TE   [] TA   [] neuro   [] other: Patient Education: [x] Review HEP    [] Progressed/Changed HEP based on:   [] positioning   [] body mechanics   [] transfers   [] heat/ice application    [] other:      Other Objective/Functional Measures:  Increased wt with LAQ and SAQ, added SB HS curls     Pain Level (0-10 scale) post treatment: 0    ASSESSMENT/Changes in Function:  The pt was able to progress with strengthening today. He requires cues for pacing with therex and form at times. He was challenged with dynamic gait activities. Good effort noted and pt is compliant with HEP. Patient will continue to benefit from skilled PT services to modify and progress therapeutic interventions, address functional mobility deficits, address ROM deficits, address strength deficits, analyze and address soft tissue restrictions, analyze and modify body mechanics/ergonomics and address imbalance/dizziness to attain remaining goals. []  See Plan of Care  []  See progress note/recertification  []  See Discharge Summary         Progress towards goals / Updated goals:  Short Term Goals: To be accomplished in 1 weeks:              1. The pt will be I and compliant with HEP              IE- issued HEP              XEPXHPB: The patient reports compliance with HEP. 10/07/2020  Long Term Goals: To be accomplished in 4 weeks:              1. Improve left knee extension to lacking 5 degrees or less for improved ability for daily tasks.               IE- lacking 8 degrees   Current: limited change to date 10-13-20              2.  Improve left quad MMT to 4+/5 to improve ability for standing and daily tasks.               IE- 4/5              3. Improve left glute med MMT to 4/5 to improve ability for gait              IE- 4-/5              4. The pt will report a little difficulty with performing light activities at home.              IE- moderate              5. Improve Romberg test with EC to 30 sec for decreased fall risk with ADL              IE- 8 sec.      PLAN  []  Upgrade activities as tolerated     [x]  Continue plan of care  []  Update interventions per flow sheet       []  Discharge due to:_  []  Other:_      Sid Ramirez, PT 10/13/2020  11:05 AM    Future Appointments   Date Time Provider Jesús Casper   10/16/2020 10:00 AM David Tamelas, PT [facility]   10/20/2020  9:45 AM Neil Vázquez, PT [facility]   10/23/2020  9:15 AM [facility], PT [facility]   10/27/2020  9:15 AM RUSTs, PT [facility]   10/29/2020 10:00 AM [facility], PT [facility]   11/16/2020 10:30 AM [facility] NURSE [facility]   12/2/2020  2:30 PM Lola Chapman MD [facility]

## 2020-10-16 ENCOUNTER — HOSPITAL ENCOUNTER (OUTPATIENT)
Dept: PHYSICAL THERAPY | Age: 75
Discharge: HOME | End: 2020-10-16
Payer: MEDICARE

## 2020-10-16 PROCEDURE — 97112 NEUROMUSCULAR REEDUCATION: CPT

## 2020-10-16 PROCEDURE — 97110 THERAPEUTIC EXERCISES: CPT

## 2020-10-16 NOTE — PROGRESS NOTES
PT DAILY TREATMENT NOTE 10-18    Patient Name: Selina Devine  Date:10/16/2020  : 1945  [x]  Patient  Verified  Payor: [place] MEDICARE / Plan: VA MEDICARE PART A & B / Product Type: Medicare /    In time:1000  Out time:1045  Total Treatment Time (min): 45  Visit #: 4 of 8    Medicare/BCBS Only   Total Timed Codes (min):  45 1:1 Treatment Time:  45       Treatment Area: Pain in left knee [M25.562]    SUBJECTIVE  Pain Level (0-10 scale): 2  Any medication changes, allergies to medications, adverse drug reactions, diagnosis change, or new procedure performed?: [x] No    [] Yes (see summary sheet for update)  Subjective functional status/changes:   [] No changes reported  The pt reports a little pain today but not too bad.      OBJECTIVE    Modality rationale:    Min Type Additional Details    [] Estim:  []Unatt       []IFC  []Premod                        []Other:  []w/ice   []w/heat  Position:  Location:    [] Estim: []Att    []TENS instruct  []NMES                    []Other:  []w/US   []w/ice   []w/heat  Position:  Location:    []  Traction: [] Cervical       []Lumbar                       [] Prone          []Supine                       []Intermittent   []Continuous Lbs:  [] before manual  [] after manual    []  Ultrasound: []Continuous   [] Pulsed                           []1MHz   []3MHz W/cm2:  Location:    []  Iontophoresis with dexamethasone         Location: [] Take home patch   [] In clinic    []  Ice     []  heat  []  Ice massage  []  Laser   []  Anodyne Position:  Location:    []  Laser with stim  []  Other:  Position:  Location:    []  Vasopneumatic Device Pressure:       [] lo [] med [] hi   Temperature: [] lo [] med [] hi   [] Skin assessment post-treatment:  []intact []redness- no adverse reaction    []redness  adverse reaction:       35 min Therapeutic Exercise:  [x] See flow sheet :   Rationale: increase ROM and increase strength to improve the patients ability to perform ADL       10 min Neuromuscular Re-education:  [x]  See flow sheet  Balance and dynamic gait activites:   Rationale: improve coordination, improve balance and increase proprioception  to improve the patients ability to p            With   [] TE   [] TA   [] neuro   [] other: Patient Education: [x] Review HEP    [] Progressed/Changed HEP based on:   [] positioning   [] body mechanics   [] transfers   [] heat/ice application    [] other:      Other Objective/Functional Measures: increased wt with LAQ, SAQ. Pain Level (0-10 scale) post treatment: 0    ASSESSMENT/Changes in Function:  Improved modified tandem noted today. The pt was able to progress with LAQ and SAQ without difficulty. Patient will continue to benefit from skilled PT services to modify and progress therapeutic interventions, address functional mobility deficits, address strength deficits, analyze and cue movement patterns and address imbalance/dizziness to attain remaining goals. []  See Plan of Care  []  See progress note/recertification  []  See Discharge Summary         Progress towards goals / Updated goals:  Short Term Goals: To be accomplished in 1 weeks:              1. The pt will be I and compliant with HEP              IE- issued HEP              GKEXREW: The patient reports compliance with HEP. 10/07/2020  Long Term Goals: To be accomplished in 4 weeks:              1. Improve left knee extension to lacking 5 degrees or less for improved ability for daily tasks.               IE- lacking 8 degrees              Current: limited change to date 10-13-20              2. Improve left quad MMT to 4+/5 to improve ability for standing and daily tasks.               IE- 4/5   Current: gradual progress noted with therex 10-16-20              3. Improve left glute med MMT to 4/5 to improve ability for gait              IE- 4-/5              4.  The pt will report a little difficulty with performing light activities at home.              IE- moderate              5.  Improve Romberg test with EC to 30 sec for decreased fall risk with ADL              IE- 8 sec.     PLAN  []  Upgrade activities as tolerated     [x]  Continue plan of care  []  Update interventions per flow sheet       []  Discharge due to:_  []  Other:_      Nette Hill, PT 10/16/2020  10:01 AM    Future Appointments   Date Time Provider Jesús Casper   10/20/2020  9:45 AM Merline Card, PT [facility]   10/23/2020  9:15 AM Arletta Passer, PT [facility]   10/27/2020  9:15 AM Arletta Passer, PT [facility]   10/29/2020 10:00 AM Arletta Passer, PT [facility]   11/16/2020 10:30 AM [facility] NURSE [facility]   12/2/2020  2:30 PM [facility]

## 2020-10-20 ENCOUNTER — HOSPITAL ENCOUNTER (OUTPATIENT)
Dept: PHYSICAL THERAPY | Age: 75
Discharge: HOME | End: 2020-10-20
Payer: MEDICARE

## 2020-10-20 PROCEDURE — 97112 NEUROMUSCULAR REEDUCATION: CPT

## 2020-10-20 PROCEDURE — 97110 THERAPEUTIC EXERCISES: CPT

## 2020-10-20 NOTE — PROGRESS NOTES
PT DAILY TREATMENT NOTE 10-18    Patient Name: Sadie Darnell  Date:10/20/2020  : 1945  [x]  Patient  Verified  Payor: [place] MEDICARE / Plan: VA MEDICARE PART A & B / Product Type: Medicare /    In time:9:45  Out time:10:35  Total Treatment Time (min): 50  Visit #: 5 of 8    Medicare/BCBS Only   Total Timed Codes (min):  50 1:1 Treatment Time:  50       Treatment Area: Pain in left knee [M25.562]    SUBJECTIVE  Pain Level (0-10 scale): 0/10  Any medication changes, allergies to medications, adverse drug reactions, diagnosis change, or new procedure performed?: [x] No    [] Yes (see summary sheet for update)  Subjective functional status/changes:   [] No changes reported  The patient denies any changes upon arrival.     OBJECTIVE  40 min Therapeutic Exercise:  [x] See flow sheet :   Rationale: increase ROM and increase strength to improve the patients ability to improve ADL ease. 10 min Neuromuscular Re-education:  [x]  See flow sheet :   Rationale: improve coordination, improve balance and increase proprioception  to improve the patients ability to improve ADL ease. With   [] TE   [] TA   [] neuro   [] other: Patient Education: [x] Review HEP    [] Progressed/Changed HEP based on:   [] positioning   [] body mechanics   [] transfers   [] heat/ice application    [] other:      Other Objective/Functional Measures: The patient asked if he could try a seated position on a swiss ball. PT guarded patient and advised we try this in the corner of the room to allow for the aide of the wall if needed. Due to instability on a larger ball, it was recommended the patient not perform this at home by himself. He stated, \"I think my instincts were right on that. \" when PT provided suggestion. Pain Level (0-10 scale) post treatment: 0/10    ASSESSMENT/Changes in Function: He demonstrates improved negotiation of small hurdles today.  Performed EC without LOB down track, but required cues to increased stride length. Patient will continue to benefit from skilled PT services to modify and progress therapeutic interventions, address functional mobility deficits, address ROM deficits, address strength deficits, analyze and address soft tissue restrictions, analyze and cue movement patterns, analyze and modify body mechanics/ergonomics, assess and modify postural abnormalities and instruct in home and community integration to attain remaining goals. []  See Plan of Care  []  See progress note/recertification  []  See Discharge Summary         Progress towards goals / Updated goals:  Short Term Goals: To be accomplished in 1 weeks:              1. The pt will be I and compliant with HEP              IE- issued HEP              TLZBBPZ: The patient reports compliance with HEP. 10/07/2020  Long Term Goals: To be accomplished in 4 weeks:              1. Improve left knee extension to lacking 5 degrees or less for improved ability for daily tasks.               - lacking 8 degrees              Current: limited change to date 10-13-20              2. Improve left quad MMT to 4+/5 to improve ability for standing and daily tasks.               IE- 4/5              Current: gradual progress noted with therex 10-16-20              3. Improve left glute med MMT to 4/5 to improve ability for gait              IE- 4-/5              4. The pt will report a little difficulty with performing light activities at home.              IE- moderate              5.  Improve Romberg test with EC to 30 sec for decreased fall risk with ADL              IE- 8 sec.    Current: Remains about 8 seconds 10/20/2020    PLAN  []  Upgrade activities as tolerated     [x]  Continue plan of care  []  Update interventions per flow sheet       []  Discharge due to:_  []  Other:_      Lubna Marinelli PT 10/20/2020  10:00 AM    Future Appointments   Date Time Provider Jesús Casper   10/23/2020  9:15 AM Gabby Estrada, PT [facility]   10/27/2020 9:15 AM Joe Wayne, PT [facility]   10/29/2020 10:00 AM Joe Wayne, PT [facility]   11/16/2020 10:30 AM [facility] NURSE [facility]   12/2/2020  2:30 PM Dion Delvalle MD [facility] OpenPM

## 2020-10-23 ENCOUNTER — HOSPITAL ENCOUNTER (OUTPATIENT)
Dept: PHYSICAL THERAPY | Age: 75
Discharge: HOME | End: 2020-10-23
Payer: MEDICARE

## 2020-10-23 PROCEDURE — 97110 THERAPEUTIC EXERCISES: CPT

## 2020-10-23 PROCEDURE — 97112 NEUROMUSCULAR REEDUCATION: CPT

## 2020-10-23 NOTE — PROGRESS NOTES
PT DAILY TREATMENT NOTE 10-18    Patient Name: Phong Weinstein  Date:10/23/2020  : 1945  [x]  Patient  Verified  Payor: [place] MEDICARE / Plan: [place] MEDICARE PART A & B / Product Type: Medicare /    In [id number]  Out time:1005  Total Treatment Time (min): 50  Visit #: 6 of 8    Medicare/BCBS Only   Total Timed Codes (min):  50 1:1 Treatment Time:  50       Treatment Area: Pain in left knee [M25.562]    SUBJECTIVE  Pain Level (0-10 scale): 2  Any medication changes, allergies to medications, adverse drug reactions, diagnosis change, or new procedure performed?: [x] No    [] Yes (see summary sheet for update)  Subjective functional status/changes:   [] No changes reported  No complaints. OBJECTIVE      38 min Therapeutic Exercise:  [x] See flow sheet :   Rationale: increase ROM and increase strength to improve the patients ability to perform daily tasks. 12 min Neuromuscular Re-education:  [x]  See flow sheet : balance activities   Rationale: improve balance and increase proprioception  to improve the patients ability to perform functional activities              With   [] TE   [] TA   [] neuro   [] other: Patient Education: [x] Review HEP    [] Progressed/Changed HEP based on:   [] positioning   [] body mechanics   [] transfers   [] heat/ice application    [] other:      Other Objective/Functional Measures: dynamic step up with finger support, added sidelying hip abd and lowered sit to stand height     Pain Level (0-10 scale) post treatment: 0    ASSESSMENT/Changes in Function:  The pt is progressing well with PT as noted with exercise progression and improved quad strength. He will likely benefit from continuation of PT following next weeks last appointments.      Patient will continue to benefit from skilled PT services to modify and progress therapeutic interventions, address functional mobility deficits, address strength deficits, analyze and cue movement patterns and address imbalance/dizziness to attain remaining goals. []  See Plan of Care  []  See progress note/recertification  []  See Discharge Summary         Progress towards goals / Updated goals:  Short Term Goals: To be accomplished in 1 weeks:              1. The pt will be I and compliant with HEP              IE- issued HEP              SWWFVYI: The patient reports compliance with HEP. 10/07/2020  Long Term Goals: To be accomplished in 4 weeks:              1. Improve left knee extension to lacking 5 degrees or less for improved ability for daily tasks.               OT- lacking 8 degrees              Current: limited change to date 10-13-20              2. Improve left quad MMT to 4+/5 to improve ability for standing and daily tasks.               IE- 4/5   Current: MET on 10-23-20              Current: gradual progress noted with therex 10-16-20              3. Improve left glute med MMT to 4/5 to improve ability for gait              IE- 4-/5              4. The pt will report a little difficulty with performing light activities at home.              IE- moderate              5.  Improve Romberg test with EC to 30 sec for decreased fall risk with ADL              IE- 8 sec.                Current: Remains about 8 seconds 10/20/2020    PLAN  []  Upgrade activities as tolerated     [x]  Continue plan of care  []  Update interventions per flow sheet       []  Discharge due to:_  []  Other:_      Francis Celis, PT 10/23/2020  9:10 AM    Future Appointments   Date Time Provider Jesús Casper   10[facility]23[facility]2020  [facility]   10/27/2020  9:15 AM Gill Santoyo, PT [facility]   10/29/2020 10:00 AM Gill Santoyo, PT [facility]   11/16/2020 10:30 AM [facility] NURSE [facility]   12/2/2020  2:30 PM Desmond Ruth MD [facility] OpenPM

## 2020-10-27 ENCOUNTER — HOSPITAL ENCOUNTER (OUTPATIENT)
Dept: PHYSICAL THERAPY | Age: 75
Discharge: HOME | End: 2020-10-27
Payer: MEDICARE

## 2020-10-27 PROCEDURE — 97112 NEUROMUSCULAR REEDUCATION: CPT

## 2020-10-27 PROCEDURE — 97110 THERAPEUTIC EXERCISES: CPT

## 2020-10-27 NOTE — PROGRESS NOTES
PT DAILY TREATMENT NOTE 10-18    Patient Name: Selina Devine  Date:10/27/2020  : 1945  [x]  Patient  Verified  Payor: VA MEDICARE / Plan: VA MEDICARE PART A & B / Product Type: Medicare /    In [id number]  Out time:1007  Total Treatment Time (min): 52  Visit #: 7 of 8    Medicare/BCBS Only   Total Timed Codes (min):  52 1:1 Treatment Time:  45       Treatment Area: Pain in left knee [M25.562]    SUBJECTIVE  Pain Level (0-10 scale): 2  Any medication changes, allergies to medications, adverse drug reactions, diagnosis change, or new procedure performed?: [x] No    [] Yes (see summary sheet for update)  Subjective functional status/changes:   [] No changes reported  The pt reports improvement for getting up from toliet    OBJECTIVE    Modality rationale:    Min Type Additional Details    [] Estim:  []Unatt       []IFC  []Premod                        []Other:  []w/ice   []w/heat  Position:  Location:    [] Estim: []Att    []TENS instruct  []NMES                    []Other:  []w/US   []w/ice   []w/heat  Position:  Location:    []  Traction: [] Cervical       []Lumbar                       [] Prone          []Supine                       []Intermittent   []Continuous Lbs:  [] before manual  [] after manual    []  Ultrasound: []Continuous   [] Pulsed                           []1MHz   []3MHz W/cm2:  Location:    []  Iontophoresis with dexamethasone         Location: [] Take home patch   [] In clinic    []  Ice     []  heat  []  Ice massage  []  Laser   []  Anodyne Position:  Location:    []  Laser with stim  []  Other:  Position:  Location:    []  Vasopneumatic Device Pressure:       [] lo [] med [] hi   Temperature: [] lo [] med [] hi   [] Skin assessment post-treatment:  []intact []redness- no adverse reaction    []redness  adverse reaction:       42 min Therapeutic Exercise:  [x] See flow sheet :   Rationale: increase ROM and increase strength to improve the patients ability to perform functional tasks. 10 min Neuromuscular Re-education:  [x]  See flow sheet :   Rationale: improve balance and increase proprioception  to improve the patients ability to perform ADL            With   [] TE   [] TA   [] neuro   [] other: Patient Education: [x] Review HEP    [] Progressed/Changed HEP based on:   [] positioning   [] body mechanics   [] transfers   [] heat/ice application    [] other:      Other Objective/Functional Measures: increased wt with HS curls     Pain Level (0-10 scale) post treatment: 0    ASSESSMENT/Changes in Function:  The pt has nearly met Romberg goal and is progressing well with strengthening. He will likely benefit from continuation of PT. Patient will continue to benefit from skilled PT services to modify and progress therapeutic interventions, address functional mobility deficits, address strength deficits, analyze and cue movement patterns and address imbalance/dizziness to attain remaining goals. []  See Plan of Care  []  See progress note/recertification  []  See Discharge Summary         Progress towards goals / Updated goals:  Short Term Goals: To be accomplished in 1 weeks:              1. The pt will be I and compliant with HEP              IE- issued HEP              TRUGEWF: The patient reports compliance with HEP. 10/07/2020  Long Term Goals: To be accomplished in 4 weeks:              1. Improve left knee extension to lacking 5 degrees or less for improved ability for daily tasks.               XQ- lacking 8 degrees              Current: limited change to date 10-13-20              2. Improve left quad MMT to 4+/5 to improve ability for standing and daily tasks.               IE- 4/5              Current: MET on 10-23-20              Current: gradual progress noted with therex 10-16-20              3. Improve left glute med MMT to 4/5 to improve ability for gait              IE- 4-/5              4.  The pt will report a little difficulty with performing light activities at home.              OB- moderate              5.  Improve Romberg test with EC to 30 sec for decreased fall risk with ADL              IE- 8 sec.                Current: progressing 25 seconds 10/27/2020       PLAN  []  Upgrade activities as tolerated     [x]  Continue plan of care  []  Update interventions per flow sheet       []  Discharge due to:_  []  Other:_      Francis Celis PT 10/27/2020  9:27 AM    Future Appointments   Date Time Provider Jesús Casper   10/29/2020 10:00 AM Gill Santoyo PT [facility]   11/16/2020 10:30 AM [facility] NURSE [facility] Open[facility]   12/2/2020  2:30 PM Desmond Ruth MD [facility] OpenPM

## 2020-10-29 ENCOUNTER — HOSPITAL ENCOUNTER (OUTPATIENT)
Dept: PHYSICAL THERAPY | Age: 75
Discharge: HOME | End: 2020-10-29
Payer: MEDICARE

## 2020-10-29 PROCEDURE — 97112 NEUROMUSCULAR REEDUCATION: CPT

## 2020-10-29 PROCEDURE — 97110 THERAPEUTIC EXERCISES: CPT

## 2020-10-29 NOTE — PROGRESS NOTES
PT DAILY TREATMENT NOTE 10-18    Patient Name: Mya Neal  Date:10/29/2020  : 1945  [x]  Patient  Verified  Payor: [place] MEDICARE / Plan: [place] MEDICARE PART A & B / Product Type: Medicare /    In time:1000  Out time:1055  Total Treatment Time (min): 55  Visit #: 8 of 8    Medicare/BCBS Only   Total Timed Codes (min):  55 1:1 Treatment Time:  55       Treatment Area: Pain in left knee [M25.562]    SUBJECTIVE  Pain Level (0-10 scale): 2-3  Any medication changes, allergies to medications, adverse drug reactions, diagnosis change, or new procedure performed?: [x] No    [] Yes (see summary sheet for update)  Subjective functional status/changes:   [] No changes reported   The pt reports he is improving with PT.     OBJECTIVE      43 min Therapeutic Exercise:  [x] See flow sheet :   Rationale: increase ROM and increase strength to improve the patients ability to perform daily tasks. 12 min Neuromuscular Re-education:  [x]  See flow sheet : balance activities, dynamic gait activities   Rationale: improve coordination, improve balance and increase proprioception  to improve the patients ability to perform daily activities              With   [] TE   [] TA   [] neuro   [] other: Patient Education: [x] Review HEP    [] Progressed/Changed HEP based on:   [] positioning   [] body mechanics   [] transfers   [] heat/ice application    [] other:      Other Objective/Functional Measures: see recert     Pain Level (0-10 scale) post treatment: 0    ASSESSMENT/Changes in Function:  See recert    Patient will continue to benefit from skilled PT services to modify and progress therapeutic interventions, address functional mobility deficits, address ROM deficits, address strength deficits, analyze and cue movement patterns and address imbalance/dizziness to attain remaining goals.      []  See Plan of Care  [x]  See progress note/recertification  []  See Discharge Summary         Progress towards goals / Updated goals:  Short Term Goals: To be accomplished in 1 weeks:              1. The pt will be I and compliant with HEP              IE- issued HEP              UUSSGOW: MET The patient reports compliance with HEP. 10/07/2020  Long Term Goals: To be accomplished in 4 weeks:              1. Improve left knee extension to lacking 5 degrees or less for improved ability for daily tasks.               WE- lacking 8 degrees              HVLAWIX: lacks 6 degreess on 10-29-20              2. Improve left quad MMT to 4+/5 to improve ability for standing and daily tasks.               IE- 4/5              Current: MET on 10-23-20                          3. Improve left glute med MMT to 4/5 to improve ability for gait              IE- 4-/5   Current: 4-/5 on 10-29-20              4. The pt will report a little difficulty with performing light activities at home.              IE- moderate   Current: moderate on 10-29-20              5.  Improve Romberg test with EC to 30 sec for decreased fall risk with ADL              IE- 8 sec.                Current: progressing 25 seconds 10/27/2020       PLAN  [x]  Upgrade activities as tolerated     []  Continue plan of care  []  Update interventions per flow sheet       []  Discharge due to:_  []  Other:_      Nicola Archer, PT 10/29/2020  10:05 AM    Future Appointments   Date Time Provider Jesús Casper   11/16/2020 10:30 AM [facility] NURSE [facility]   12/2/2020  2:30 PM Christian Morgan MD [facility]

## 2020-10-29 NOTE — PROGRESS NOTES
[facility]  [address]  [place], [address]  [phone] fax    Continued Plan of Care/ Re-certification for Physical Therapy Services     Patient name: Sandrita Leung Start of Care: 10/2/2020   Referral source: Adrienne Barnett MD : 1945               Medical Diagnosis: Pain in left knee [M25.562]  Payor: VA MEDICARE / Plan: VA MEDICARE PART A & B / Product Type: Medicare /  Onset Date:               Treatment Diagnosis: left knee pain   Prior Hospitalization: see medical history Provider#: [id number]   Medications: Verified on Patient summary List    Comorbidities: heart disease, OA, HTN   Prior Level of Function: functionally I with daily activities                            Visits from Start of Care: 8    Missed Visits: 0    The Plan of Care and following information is based on the patient's current status:  Progress towards goals  Short Term Goals: To be accomplished in 1 weeks:              1. The pt will be I and compliant with HEP              IE- issued HEP              IZWTJDZ: MET The patient reports compliance with HEP. 10/07/2020  Long Term Goals: To be accomplished in 4 weeks:              1. Improve left knee extension to lacking 5 degrees or less for improved ability for daily tasks.               RC- lacking 8 degrees              TJEEAZW: lacks 6 degreess on 10-29-20              2. Improve left quad MMT to 4+/5 to improve ability for standing and daily tasks.               IE- 4/5              Current: MET on 10-23-20                          3. Improve left glute med MMT to 4/5 to improve ability for gait              IE- 4-/5              Current: 4-/5 on 10-29-20              4. The pt will report a little difficulty with performing light activities at home.              IE- moderate              Current: moderate on 10-29-20              5.  Improve Romberg test with EC to 30 sec for decreased fall risk with ADL              IE- 8 sec.                Current: progressing 25 seconds 10/27/2020    Key functional changes: The pt is progressing well with PT demonstrating improved strength of the left hip and knee and improved balance. Problems/ barriers to goal attainment: none     Problem List: pain affecting function, decrease ROM, decrease strength, impaired gait/ balance, decrease ADL/ functional abilitiies, decrease activity tolerance and decrease flexibility/ joint mobility    Treatment Plan: Therapeutic exercise, Therapeutic activities, Neuromuscular re-education, Physical agent/modality, Gait/balance training, Manual therapy, Patient education and Self Care training     Patient Goal (s) has been updated and includes: \"To continue to get strronger\"     Goals for this certification period to be accomplished in 4 weeks:              0. Improve left glute med MMT to 4/5 to improve ability for gait              PN- 4-/5              2. The pt will report a little difficulty with performing light activities at home.              PN- moderate              3. Improve Romberg test with EC to 30 sec for decreased fall risk with ADL             PN: 25 seconds 10/27/2020    Frequency / Duration: Patient to be seen 2 times per week for 4 weeks:    Assessment / Recommendations: The pt will benefit from continued PT to further functional progress    Certification Period: 11-6-20 to 12-5-20    Padmini Nance, PT 10/29/2020 10:24 AM    ________________________________________________________________________  I certify that the above Therapy Services are being furnished while the patient is under my care. I agree with the treatment plan and certify that this therapy is necessary. [] I have read the above and request that my patient continue as recommended.   [] I have read the above report and request that my patient continue therapy with the following changes/special instructions: _____________________________________________  [] I have read the above report and request that my patient be discharged from therapy    Physician's Signature:____________Date:_________TIME:________    ** Signature, Date and Time must be completed for valid certification **    Please sign and return to [facility] [address]  [address]  [place], [address]  [phone] fax

## 2020-11-06 ENCOUNTER — HOSPITAL ENCOUNTER (OUTPATIENT)
Dept: PHYSICAL THERAPY | Age: 75
Discharge: HOME | End: 2020-11-06
Payer: MEDICARE

## 2020-11-06 PROCEDURE — 97110 THERAPEUTIC EXERCISES: CPT

## 2020-11-06 PROCEDURE — 97112 NEUROMUSCULAR REEDUCATION: CPT

## 2020-11-06 NOTE — PROGRESS NOTES
PT DAILY TREATMENT NOTE     Patient Name: Devon Ruelas  Date:2020  : 1945  [x]  Patient  Verified  Payor: VA MEDICARE / Plan: VA MEDICARE PART A & B / Product Type: Medicare /    In time:7:00  Out time:7:54  Total Treatment Time (min): 47  Visit #: 1 of 8    Medicare/BCBS Only   Total Timed Codes (min):  44 1:1 Treatment Time:  44       Treatment Area: Pain in left knee [M25.562]    SUBJECTIVE  Pain Level (0-10 scale): 0/10  Any medication changes, allergies to medications, adverse drug reactions, diagnosis change, or new procedure performed?: [x] No    [] Yes (see summary sheet for update)  Subjective functional status/changes:   [] No changes reported  The patient states that he is feeling good upon arrival this morning. OBJECTIVE  39 min Therapeutic Exercise:  [x] See flow sheet :   Rationale: increase ROM and increase strength to improve the patients ability to improve ADL ease. 15 min Neuromuscular Re-education:  [x]  See flow sheet :   Rationale: improve coordination, improve balance and increase proprioception  to improve the patients ability to improve ADL ease. With   [] TE   [] TA   [] neuro   [] other: Patient Education: [x] Review HEP    [] Progressed/Changed HEP based on:   [] positioning   [] body mechanics   [] transfers   [] heat/ice application    [] other:      Other Objective/Functional Measures:   EC airex 30\" without LOB  Pain Level (0-10 scale) post treatment: 0/10    ASSESSMENT/Changes in Function: The patient was noted to have significantly better static balance today, able to perform EC FT on airex without LOB. 1/2 stance on bosu appreciated better stability without LOB. Small hurdles required min assist 25% of the time due to weight shift limitation over left LE.     Patient will continue to benefit from skilled PT services to modify and progress therapeutic interventions, address functional mobility deficits, address ROM deficits, address strength deficits, analyze and address soft tissue restrictions, analyze and cue movement patterns, analyze and modify body mechanics/ergonomics, assess and modify postural abnormalities, address imbalance/dizziness and instruct in home and community integration to attain remaining goals. []  See Plan of Care  []  See progress note/recertification  []  See Discharge Summary         Progress towards goals / Updated goals:  Goals for this certification period to be accomplished in 4 weeks:              1. Improve left glute med MMT to 4/5 to improve ability for gait              PN- 4-/5              2.  The pt will report a little difficulty with performing light activities at home.              PN- moderate              3. Improve Romberg test with EC to 30 sec for decreased fall risk with ADL             PN: 25 seconds 10/27/2020   Current: 30 seconds without LOB 11/06/2020       PLAN  []  Upgrade activities as tolerated     []  Continue plan of care  []  Update interventions per flow sheet       []  Discharge due to:_  []  Other:_      Koby Guevara, PT 11/6/2020  7:04 AM    Future Appointments   Date Time Provider Jesús Casper   11/9/2020  2:30 PM Muna Davis, PT [facility]   11/13/2020 10:00 AM Himanshu Fuel, PT [facility]   11/16/2020 10:30 AM [facility] NURSE [facility]   11/16/2020 11:15 AM Himanshu Fuel, PT [facility]   11/20/2020  8:30 AM Himanshu Fuel, PT [facility]   11/23/2020 10:30 AM Himanshu Fuel, PT [facility]   11/27/2020 10:00 AM Himanshu Fuel, PT [facility]   11/30/2020 10:00 AM Muna Davis, PT [facility]   12/2/2020  2:30 PM Ortiz Elizabeth MD [facility]

## 2020-11-09 ENCOUNTER — HOSPITAL ENCOUNTER (OUTPATIENT)
Dept: PHYSICAL THERAPY | Age: 75
Discharge: HOME | End: 2020-11-09
Payer: MEDICARE

## 2020-11-09 PROCEDURE — 97110 THERAPEUTIC EXERCISES: CPT

## 2020-11-09 PROCEDURE — 97112 NEUROMUSCULAR REEDUCATION: CPT

## 2020-11-09 NOTE — PROGRESS NOTES
PT DAILY TREATMENT NOTE     Patient Name: Donato Dupont  Date:2020  : 1945  [x]  Patient  Verified  Payor: VA MEDICARE / Plan: VA MEDICARE PART A & B / Product Type: Medicare /    In time:2:30  Out time:3:23  Total Treatment Time (min): 53  Visit #: 2 of 8    Medicare/BCBS Only   Total Timed Codes (min):  53 1:1 Treatment Time:  53       Treatment Area: Pain in left knee [M25.562]    SUBJECTIVE  Pain Level (0-10 scale): 2  Any medication changes, allergies to medications, adverse drug reactions, diagnosis change, or new procedure performed?: [x] No    [] Yes (see summary sheet for update)  Subjective functional status/changes:   [] No changes reported  The pt reports he is doing\"pretty good\"    OBJECTIVE       41 min Therapeutic Exercise:  [x] See flow sheet :   Rationale: increase ROM and increase strength to improve the patients ability to perform ADL     12 min Neuromuscular Re-education:  [x]  See flow sheet : airex, dynamic gait   Rationale: improve coordination, improve balance and increase proprioception  to improve the patients ability to perform functional tasks. With   [] TE   [] TA   [] neuro   [] other: Patient Education: [x] Review HEP    [] Progressed/Changed HEP based on:   [] positioning   [] body mechanics   [] transfers   [] heat/ice application    [] other:      Other Objective/Functional Measures: increased LAQ resistance     Pain Level (0-10 scale) post treatment: 0    ASSESSMENT/Changes in Function: The pt with great effort and is progressing well with PT. He is challenged with left lateral weight shift for initiation of lateral sandy step overs. Strength and balance are progressing.      Patient will continue to benefit from skilled PT services to modify and progress therapeutic interventions, address functional mobility deficits, address ROM deficits, address strength deficits, analyze and address soft tissue restrictions and analyze and cue movement patterns to attain remaining goals. []  See Plan of Care  []  See progress note/recertification  []  See Discharge Summary         Progress towards goals / Updated goals:  Goals for this certification period to be accomplished in 4 weeks:              1. Improve left glute med MMT to 4/5 to improve ability for gait              PN- 4-/5              2.  The pt will report a little difficulty with performing light activities at home.              PN- moderate              3. Improve Romberg test with EC to 30 sec for decreased fall risk with ADL             PN: 25 seconds 10/27/2020              Current: 30 seconds without LOB 11/06/2020    PLAN  []  Upgrade activities as tolerated     [x]  Continue plan of care  []  Update interventions per flow sheet       []  Discharge due to:_  []  Other:_      Alberto Whyte, PT 11/9/2020  2:41 PM    Future Appointments   Date Time Provider Jesús Casper   11/16/2020 10:30 AM [facility] NURSE [facility]   11/16/2020 11:15 AM India Etienne, PT [facility]   11/20/2020  8:30 AM Oz Foley, PTA [facility]   11/23/2020  9:00 AM India Etienne, PT [facility]   11/27/2020 10:00 AM John Paul Perez, PTA [facility]   11/30/2020 10:00 AM Rose Roldan, PT [facility]   12/2/2020  2:30 PM Dudley Taylor MD [facility]

## 2020-11-13 ENCOUNTER — APPOINTMENT (OUTPATIENT)
Dept: PHYSICAL THERAPY | Age: 75
End: 2020-11-13
Payer: MEDICARE

## 2020-11-16 ENCOUNTER — HOSPITAL ENCOUNTER (OUTPATIENT)
Dept: PHYSICAL THERAPY | Age: 75
Discharge: HOME | End: 2020-11-16
Payer: MEDICARE

## 2020-11-16 PROCEDURE — 97112 NEUROMUSCULAR REEDUCATION: CPT

## 2020-11-16 PROCEDURE — 97110 THERAPEUTIC EXERCISES: CPT

## 2020-11-16 NOTE — PROGRESS NOTES
PT DAILY TREATMENT NOTE     Patient Name: Nicolas Baker  Date:2020  : 1945  [x]  Patient  Verified  Payor: VA MEDICARE / Plan: VA MEDICARE PART A & B / Product Type: Medicare /    In time:11:34  Out time:12:15  Total Treatment Time (min): 41  Visit #: 3  of 8    Medicare/BCBS Only   Total Timed Codes (min):  41 1:1 Treatment Time:  41       Treatment Area: Pain in left knee [M25.562]    SUBJECTIVE  Pain Level (0-10 scale): 3/10  Any medication changes, allergies to medications, adverse drug reactions, diagnosis change, or new procedure performed?: [x] No    [] Yes (see summary sheet for update)  Subjective functional status/changes:   [] No changes reported  Patient report no current changes. OBJECTIVE     35 min Therapeutic Exercise:  [x] See flow sheet :   Rationale: increase ROM and increase strength to improve the patients ability to ambulate with increased stability. 8 min Neuromuscular Re-education:  []  See flow sheet : time Constraints    Rationale: improve coordination, improve balance and increase proprioception  to improve the patients ability to improve independent ambulation. With   [x] TE   [] TA   [] neuro   [] other: Patient Education: [x] Review HEP    [] Progressed/Changed HEP based on:   [] positioning   [] body mechanics   [] transfers   [] heat/ice application    [] other:      Other Objective/Functional Measures: Need verbal cuing with SL Abduction. Pain Level (0-10 scale) post treatment: 3/10    ASSESSMENT/Changes in Function: Patient challenged to keep hips neutral with side stepping gait, Challenged with coordination of lifting leg over hurdles in both forward and side positioning. Added some side stepping as a prep to the sandy position. Decreased dynamic gait activities due to time constraints today, but will continue to benefit from dynamic and pelvic stabilization activities.      Patient will continue to benefit from skilled PT services to modify and progress therapeutic interventions, address functional mobility deficits, address ROM deficits, address strength deficits and analyze and cue movement patterns to attain remaining goals. [x]  See Plan of Care  []  See progress note/recertification  []  See Discharge Summary         Progress towards goals / Updated goals:  Goals for this certification period to be accomplished in 4 weeks:              1. Improve left glute med MMT to 4/5 to improve ability for gait              PN- 4-/5              2.  The pt will report a little difficulty with performing light activities at home.              PN- moderate              3. Improve Romberg test with EC to 30 sec for decreased fall risk with ADL             PN: 25 seconds 10/27/2020              Current: 30 seconds without LOB 11/06/2020    PLAN  []  Upgrade activities as tolerated     [x]  Continue plan of care  []  Update interventions per flow sheet       []  Discharge due to:_  []  Other:_      Wynette Lanes, PT 11/16/2020  8:47 AM    Future Appointments   Date Time Provider Jesús Casper   11/16/2020 10:30 AM [facility] NURSE [facility]   11/16/2020 11:15 AM Ilia Hogan, PT [facility]   11/20/2020  8:30 AM Júnior Brennan, PTA [facility]   11/23/2020  9:00 AM Ilia Hogan, PT [facility]   11/27/2020 10:00 AM Bisi Lowe, PTA [facility]   11/30/2020 10:00 AM Gill Santoyo, PT [facility]   12/2/2020  2:30 PM Desmond Ruth MD [facility]

## 2020-11-20 ENCOUNTER — HOSPITAL ENCOUNTER (OUTPATIENT)
Dept: PHYSICAL THERAPY | Age: 75
Discharge: HOME | End: 2020-11-20
Payer: MEDICARE

## 2020-11-20 PROCEDURE — 97110 THERAPEUTIC EXERCISES: CPT

## 2020-11-20 PROCEDURE — 97112 NEUROMUSCULAR REEDUCATION: CPT

## 2020-11-23 ENCOUNTER — HOSPITAL ENCOUNTER (OUTPATIENT)
Dept: PHYSICAL THERAPY | Age: 75
Discharge: HOME | End: 2020-11-23
Payer: MEDICARE

## 2020-11-23 PROCEDURE — 97110 THERAPEUTIC EXERCISES: CPT

## 2020-11-23 PROCEDURE — 97112 NEUROMUSCULAR REEDUCATION: CPT

## 2020-11-23 NOTE — PROGRESS NOTES
PT DAILY TREATMENT NOTE     Patient Name: Petros Subramanian  Date:2020  : 1945  [x]  Patient  Verified  Payor: VA MEDICARE / Plan: VA MEDICARE PART A & B / Product Type: Medicare /    In time:900 Out time:945  Total Treatment Time (min): 45  Visit #: 5  of 8    Medicare/BCBS Only   Total Timed Codes (min):  45 1:1 Treatment Time:  45       Treatment Area: Pain in left knee [M25.562]    SUBJECTIVE  Pain Level (0-10 scale): 2/10  Any medication changes, allergies to medications, adverse drug reactions, diagnosis change, or new procedure performed?: [x] No    [] Yes (see summary sheet for update)  Subjective functional status/changes:   [] No changes reported  \"Doing well today, feel like I'm getting stronger\"    OBJECTIVE    35 min Therapeutic Exercise:  [x] See flow sheet : Increased weight on Ham curls   Rationale: increase ROM and increase strength to improve the patients ability to ambulate with increased stability. 10 min Neuromuscular Re-education:  []  See flow sheet : Added step backs   Rationale: improve coordination, improve balance and increase proprioception  to improve the patients ability to ambulate with increased balance. With   [x] TE   [] TA   [x] neuro   [] other: Patient Education: [x] Review HEP    [] Progressed/Changed HEP based on:   [] positioning   [] body mechanics   [] transfers   [] heat/ice application    [] other:      Other Objective/Functional Measures: Challenged with side line abduction, gluet fatigues with there ex. Pain Level (0-10 scale) post treatment: 2/10    ASSESSMENT/Changes in Function: Patient overall strength is improving. The mental challenge with obstacles causing balance disturbance. He needs to think of actions prior to accomplishing and it does not come natural to him. Continue to upgrade gluet med strength.      Patient will continue to benefit from skilled PT services to modify and progress therapeutic interventions, address functional mobility deficits, address ROM deficits, address strength deficits, analyze and cue movement patterns, analyze and modify body mechanics/ergonomics, assess and modify postural abnormalities and address imbalance/dizziness to attain remaining goals. [x]  See Plan of Care  []  See progress note/recertification  []  See Discharge Summary         Progress towards goals / Updated goals:  Goals for this certification period to be accomplished in 4 weeks:              1. Improve left glute med MMT to 4/5 to improve ability for gait              PN- 4-/5   Current: 4-/5 11/23/20              2.  The pt will report a little difficulty with performing light activities at home.              PN- moderate              3. Improve Romberg test with EC to 30 sec for decreased fall risk with ADL             PN: 25 seconds 10/27/2020              Current: MET30 seconds without LOB 11/06/2020    PLAN  [x]  Upgrade activities as tolerated     [x]  Continue plan of care  []  Update interventions per flow sheet       []  Discharge due to:_  []  Other:_      Grant Quiroga, PT 11/23/2020  9:07 AM    Future Appointments   Date Time Provider Jesús Casper   11/27/2020 10:00 AM [facility]   11/30/2020 10:00 AM [facility]ocks[facility]   12/2/2020  2:30 PM Kashmir Rodrigues MD [facility]

## 2020-11-27 ENCOUNTER — HOSPITAL ENCOUNTER (OUTPATIENT)
Dept: PHYSICAL THERAPY | Age: 75
Discharge: HOME | End: 2020-11-27
Payer: MEDICARE

## 2020-11-27 PROCEDURE — 97110 THERAPEUTIC EXERCISES: CPT

## 2020-11-27 PROCEDURE — 97112 NEUROMUSCULAR REEDUCATION: CPT

## 2020-11-27 NOTE — PROGRESS NOTES
PT DAILY TREATMENT NOTE     Patient Name: Scott Sheets  Date:2020  : 1945  [x]  Patient  Verified  Payor: [place] MEDICARE / Plan: [place] MEDICARE PART A & B / Product Type: Medicare /    In time:9:56  Out time:10:50  Total Treatment Time (min): 54  Visit #: 5 of 8    Medicare/BCBS Only   Total Timed Codes (min):  54 1:1 Treatment Time:  54       Treatment Area: Pain in left knee [M25.562]    SUBJECTIVE  Pain Level (0-10 scale): 2  Any medication changes, allergies to medications, adverse drug reactions, diagnosis change, or new procedure performed?: [x] No    [] Yes (see summary sheet for update)  Subjective functional status/changes:   [] No changes reported  Pt reports feeling about the same    OBJECTIVE    30 min Therapeutic Exercise:  [x] See flow sheet :   Rationale: increase ROM and increase strength to improve the patients ability to perform ADLs    24 min Neuromuscular Re-education:  [x]  See flow sheet :   Rationale: increase strength, improve coordination, improve balance and increase proprioception  to improve the patients ability to perform functional tasks            With   [] TE   [] TA   [] neuro   [] other: Patient Education: [x] Review HEP    [] Progressed/Changed HEP based on:   [] positioning   [] body mechanics   [] transfers   [] heat/ice application    [] other:      Other Objective/Functional Measures:     Dyn gait: HT vert and horiz, slow march    5 Low hurdles fwd step over x 4 with 1 UE assist at window sill for balance    Sit to stand performed at low level with 360* turn     Pain Level (0-10 scale) post treatment: 0    ASSESSMENT/Changes in Function: Pt was greatly challenged with slow march and req's extra time to maintain balance between steps. Used 1UE assist at window sill to maintain balance with low sandy step overs. No c/o dizziness with sit to stand + 360* turn, but req's extra time to steady self with steps. No pain following treatment today.      Patient will continue to benefit from skilled PT services to modify and progress therapeutic interventions, address functional mobility deficits, address ROM deficits, address strength deficits, analyze and address soft tissue restrictions, analyze and cue movement patterns, analyze and modify body mechanics/ergonomics, assess and modify postural abnormalities and address imbalance/dizziness to attain remaining goals. []  See Plan of Care  []  See progress note/recertification  []  See Discharge Summary         Progress towards goals / Updated goals:  Goals for this certification period to be accomplished in 4 weeks:              1. Improve left glute med MMT to 4/5 to improve ability for gait              PN- 4-/5              Current: 4-/5 11/23/20              2.  The pt will report a little difficulty with performing light activities at home.              PN- moderate              3. Improve Romberg test with EC to 30 sec for decreased fall risk with ADL             PN: 25 seconds 10/27/2020              Current: MET30 seconds without LOB 11/06/2020    PLAN  []  Upgrade activities as tolerated     [x]  Continue plan of care  []  Update interventions per flow sheet       []  Discharge due to:_  []  Other:_      Ted Leal PTA 11/27/2020  9:50 AM    Future Appointments   Date Time Provider Jesús Casper   11/27/2020 10:00 AM Arpit Garcia, PTA [facility]   11/30/2020 10:00 [facility]   12/2/2020  2:30 PM Griselda Conte MD [facility]

## 2020-11-30 ENCOUNTER — HOSPITAL ENCOUNTER (OUTPATIENT)
Dept: PHYSICAL THERAPY | Age: 75
Discharge: HOME | End: 2020-11-30
Payer: MEDICARE

## 2020-11-30 PROCEDURE — 97110 THERAPEUTIC EXERCISES: CPT

## 2020-11-30 PROCEDURE — 97112 NEUROMUSCULAR REEDUCATION: CPT

## 2020-11-30 NOTE — PROGRESS NOTES
PT DAILY TREATMENT NOTE     Patient Name: Annette Reinoso  Date:2020  : 1945   [x]  Patient  Verified  Payor: [place] MEDICARE / Plan: [place] MEDICARE PART A & B / Product Type: Medicare /    In time:1001  Out time:1053  Total Treatment Time (min): 52  Visit #: 7 of 8    Medicare/BCBS Only   Total Timed Codes (min):  52 1:1 Treatment Time:  52       Treatment Area: Pain in left knee [M25.562]    SUBJECTIVE  Pain Level (0-10 scale): 0  Any medication changes, allergies to medications, adverse drug reactions, diagnosis change, or new procedure performed?: [x] No    [] Yes (see summary sheet for update)  Subjective functional status/changes:   [] No changes reported   The pt reports he is doing well. OBJECTIVE    37 min Therapeutic Exercise:  [x] See flow sheet :   Rationale: increase ROM and increase strength to improve the patients ability to perform ADL       15 min Neuromuscular Re-education:  [x]  See flow sheet : dynamic balance activities   Rationale: increase strength, improve balance and increase proprioception  to improve the patients ability to perform daily tasks. With   [] TE   [] TA   [] neuro   [] other: Patient Education: [x] Review HEP    [] Progressed/Changed HEP based on:   [] positioning   [] body mechanics   [] transfers   [] heat/ice application    [] other:      Other Objective/Functional Measures: increased resistance per flow sheet     Pain Level (0-10 scale) post treatment: 0    ASSESSMENT/Changes in Function: The pt has met left glute MMT goal.     Patient will continue to benefit from skilled PT services to modify and progress therapeutic interventions, address functional mobility deficits, address ROM deficits, address strength deficits and address imbalance/dizziness to attain remaining goals.      []  See Plan of Care  []  See progress note/recertification  []  See Discharge Summary         Progress towards goals / Updated goals:  Goals for this certification period to be accomplished in 4 weeks:              1. Improve left glute med MMT to 4/5 to improve ability for gait              PN- 4-/5              Current: MET 4/5 11/30/20              2.  The pt will report a little difficulty with performing light activities at home.              PN- moderate              3. Improve Romberg test with EC to 30 sec for decreased fall risk with ADL             PN: 25 seconds 10/27/2020              Current: MET30 seconds without LOB 11/06/2020    PLAN  []  Upgrade activities as tolerated     [x]  Continue plan of care  []  Update interventions per flow sheet       []  Discharge due to:_  []  Other:_      Alberto Whyte, PT 11/30/2020  10:14 AM    Future Appointments   Date Time Provider Jesús Casper   12/2/2020  2:30 PM Dudley Taylor MD [facility]

## 2020-12-10 ENCOUNTER — HOSPITAL ENCOUNTER (OUTPATIENT)
Dept: PHYSICAL THERAPY | Age: 75
Discharge: HOME | End: 2020-12-10
Payer: MEDICARE

## 2020-12-10 PROCEDURE — 97112 NEUROMUSCULAR REEDUCATION: CPT

## 2020-12-10 PROCEDURE — 97110 THERAPEUTIC EXERCISES: CPT

## 2020-12-11 ENCOUNTER — APPOINTMENT (OUTPATIENT)
Dept: PHYSICAL THERAPY | Age: 75
End: 2020-12-11
Payer: MEDICARE

## 2021-03-17 ENCOUNTER — HOSPITAL ENCOUNTER (OUTPATIENT)
Dept: PHYSICAL THERAPY | Age: 76
Discharge: HOME | End: 2021-03-17
Payer: MEDICARE

## 2021-03-17 PROCEDURE — 97161 PT EVAL LOW COMPLEX 20 MIN: CPT

## 2021-03-17 PROCEDURE — 97016 VASOPNEUMATIC DEVICE THERAPY: CPT

## 2021-03-17 PROCEDURE — 97110 THERAPEUTIC EXERCISES: CPT

## 2021-03-17 NOTE — PROGRESS NOTES
PT DAILY TREATMENT NOTE     Patient Name: Aparna Parker  Date:3/17/2021  : 1945  [x]  Patient  Verified  Payor: VA MEDICARE / Plan: VA MEDICARE PART A & B / Product Type: Medicare /    In time:1:00  Out time:1:45  Total Treatment Time (min): 45  Visit #: 1 of     Medicare/BCBS Only   Total Timed Codes (min):  15 1:1 Treatment Time:  45       Treatment Area: Left knee pain [M25.562]  Post-operative state [Z98.890]    SUBJECTIVE  Pain Level (0-10 scale): 6  Any medication changes, allergies to medications, adverse drug reactions, diagnosis change, or new procedure performed?: [x] No    [] Yes (see summary sheet for update)  Subjective functional status/changes:   [] No changes reported       OBJECTIVE    Modality rationale: decrease edema, decrease inflammation and decrease pain to improve the patients ability to improved daily activity tolerance   Min Type Additional Details    [] Estim:  []Unatt       []IFC  []Premod                        []Other:  []w/ice   []w/heat  Position:  Location:    [] Estim: []Att    []TENS instruct  []NMES                    []Other:  []w/US   []w/ice   []w/heat  Position:  Location:    []  Traction: [] Cervical       []Lumbar                       [] Prone          []Supine                       []Intermittent   []Continuous Lbs:  [] before manual  [] after manual    []  Ultrasound: []Continuous   [] Pulsed                           []1MHz   []3MHz W/cm2:  Location:    []  Iontophoresis with dexamethasone         Location: [] Take home patch   [] In clinic    []  Ice     []  heat  []  Ice massage  []  Laser   []  Anodyne Position:  Location:    []  Laser with stim  []  Other:  Position:  Location:   10 [x]  Vasopneumatic Device Pressure:       [x] lo [] med [] hi   Temperature: [x] lo [] med [] hi   [] Skin assessment post-treatment:  []intact []redness- no adverse reaction    []redness  adverse reaction:     20 min [x]Eval                  []Re-Eval       15 min Therapeutic Exercise:  [] See flow sheet : HEP   Rationale: increase ROM and increase strength to improve the patients ability to perform ADL             With   [] TE   [] TA   [] neuro   [] other: Patient Education: [x] Review HEP    [] Progressed/Changed HEP based on:   [] positioning   [] body mechanics   [] transfers   [] heat/ice application    [] other:      Other Objective/Functional Measures:       Pain Level (0-10 scale) post treatment: 5    ASSESSMENT/Changes in Function:      Patient will continue to benefit from skilled PT services to modify and progress therapeutic interventions, address functional mobility deficits, address ROM deficits, address strength deficits, analyze and address soft tissue restrictions and analyze and cue movement patterns to attain remaining goals. [x]  See Plan of Care  []  See progress note/recertification  []  See Discharge Summary         Progress towards goals / Updated goals:  Short Term Goals: To be accomplished in 1 weeks:              1. The pt will be I and compliant with HEP              IE- issued HEP  Long Term Goals:  To be accomplished in 4 weeks:              1. Improve FOTO score to predicted outcome to improve ability for daily tasks              IE- 38              2.  Improve left knee extension AROM to lacking less than 5 degrees for improved gait              IE- lacking 8              3. The pt will demonstrate 5/5 left quad strength to improve ability for all activities              IE- 4/5              4. Improve left knee AROM flexion to 120 degrees to improve ability for all activities              IE- 110 degrees              5. The pt will report no difficulty with performing light activities at home              IE- quite a bit of difficulty    PLAN  []  Upgrade activities as tolerated     [x]  Continue plan of care  []  Update interventions per flow sheet       []  Discharge due to:_  []  Other:_      Ara Lee, PT 3/17/2021  12:57 PM    Future Appointments   Date Time Provider Jesús Pennie   3/17/2021  1:00 PM Lesli Box PT [facility]   12/27/2021  9:40 AM [facility] ANITA POD1 NURSE [facility] LEE SCHNEIDER   1/5/2022  1:00 PM Ira Stephenson MD [facility]

## 2021-03-17 NOTE — PROGRESS NOTES
[facility]  [address]  [place], [address]  [phone] fax    Plan of Care/ Statement of Necessity for Physical Therapy Services    Patient name: Melissa Bradshaw Start of Care: 3/17/2021   Referral source: Ben Jaime, * : 1945    Medical Diagnosis: Left knee pain [M25.562]  Post-operative state [Z98.890]  Payor: Jennifer Lujanred / Plan: VA MEDICARE PART A & B / Product Type: Medicare /  Onset WXUI:35    Treatment Diagnosis: left knee pain s/p TKA   Prior Hospitalization: see medical history Provider#: [id number]   Medications: Verified on Patient summary List    Comorbidities: OA, HTN   Prior Level of Function: functionally I with daily asks. Works out with a        The [address] [place] [address] and following information is based on the information from the initial evaluation. Assessment/ key information: 68 y/o male presents s/p left TKA as noted above. He is ambulating with use of a straight cane and mild antalgic pattern. He reports tripping and falling yesterday onto the right knee and feels he is walking worse because of right knee pain. He states he did not land on the left knee. Left knee AROM lacks 8 degrees to 110 degrees of flexion. MMT reveals weakness in the left hip and knee. + mild to moderate edema noted of the left knee and lower leg. Incision is healing but has not fully closed. The pt will benefit from PT to address the aforementioned impairments.      Evaluation Complexity History MEDIUM  Complexity : 1-2 comorbidities / personal factors will impact the outcome/ POC ; Examination MEDIUM Complexity : 3 Standardized tests and measures addressing body structure, function, activity limitation and / or participation in recreation  ;Presentation LOW Complexity : Stable, uncomplicated  ;Clinical Decision Making MEDIUM Complexity : FOTO score of 26-74  Overall Complexity Rating: LOW   Problem List: pain affecting function, decrease ROM, decrease strength, edema affecting function, impaired gait/ balance, decrease ADL/ functional abilitiies, decrease activity tolerance and decrease flexibility/ joint mobility   Treatment Plan may include any combination of the following: Therapeutic exercise, Therapeutic activities, Neuromuscular re-education, Physical agent/modality, Gait/balance training, Manual therapy, Aquatic therapy, Patient education and Self Care training  Patient / Family readiness to learn indicated by: asking questions, trying to perform skills and interest  Persons(s) to be included in education: patient (P)  Barriers to Learning/Limitations: None  Patient Goal (s): To get back to normal activities  Patient Self Reported Health Status: good  Rehabilitation Potential: good    Short Term Goals: To be accomplished in 1 weeks:   1. The pt will be I and compliant with HEP     Long Term Goals: To be accomplished in 4 weeks:   1. Improve FOTO score to predicted outcome to improve ability for daily tasks   2. Improve left knee extension AROM to lacking less than 5 degrees for improved gait   3. The pt will demonstrate 5/5 left quad strength to improve ability for all activities   4. Improve left knee AROM flexion to 120 degrees to improve ability for all activities   5. The pt will report no difficulty with performing light activities at home     Frequency / Duration: Patient to be seen 2-3 times per week for 4 weeks. Patient/ Caregiver education and instruction: Diagnosis, prognosis, self care, activity modification and exercises   -  Plan of care has been reviewed with PTA    Certification Period: 03-17-21 to 4-15-21  Radha Hernandez, PT 3/17/2021 12:58 PM    ________________________________________________________________________    I certify that the above Therapy Services are being furnished while the patient is under my care.  I agree with the treatment plan and certify that this therapy is necessary.     [de-identified] Signature:____________________  Date:____________Time: _________     Santos Pleasure, *  Please sign and return to [facility] [address]  [place], [address]  [phone] fax

## 2021-03-24 ENCOUNTER — HOSPITAL ENCOUNTER (OUTPATIENT)
Dept: PHYSICAL THERAPY | Age: 76
Discharge: HOME | End: 2021-03-24
Payer: MEDICARE

## 2021-03-24 PROCEDURE — 97110 THERAPEUTIC EXERCISES: CPT

## 2021-03-24 PROCEDURE — 97016 VASOPNEUMATIC DEVICE THERAPY: CPT

## 2021-03-24 PROCEDURE — 97112 NEUROMUSCULAR REEDUCATION: CPT

## 2021-03-24 PROCEDURE — 97140 MANUAL THERAPY 1/> REGIONS: CPT

## 2021-03-24 NOTE — PROGRESS NOTES
PT DAILY TREATMENT NOTE     Patient Name: Francisco Ruvalcaba  Date:3/24/2021  : 1945  [x]  Patient  Verified  Payor: VA MEDICARE / Plan: VA MEDICARE PART A & B / Product Type: Medicare /    In time:12:15  Out time:1:18  Total Treatment Time (min): 61  Visit #: 2 of     Medicare/BCBS Only   Total Timed Codes (min):  53 1:1 Treatment Time:  45       Treatment Area: Left knee pain [M25.562]  Post-operative state [Z98.890]    SUBJECTIVE  Pain Level (0-10 scale): 5-6  Any medication changes, allergies to medications, adverse drug reactions, diagnosis change, or new procedure performed?: [x] No    [] Yes (see summary sheet for update)  Subjective functional status/changes:   [] No changes reported  The pt reports he did too much yesterday and is hurting a bit today. He reports he walked about 1/2 mile and rode a stationary bike for 20 min.      OBJECTIVE    Modality rationale: decrease edema, decrease inflammation and decrease pain to improve the patients ability to perform ADL   Min Type Additional Details    [] Estim:  []Unatt       []IFC  []Premod                        []Other:  []w/ice   []w/heat  Position:  Location:    [] Estim: []Att    []TENS instruct  []NMES                    []Other:  []w/US   []w/ice   []w/heat  Position:  Location:    []  Traction: [] Cervical       []Lumbar                       [] Prone          []Supine                       []Intermittent   []Continuous Lbs:  [] before manual  [] after manual    []  Ultrasound: []Continuous   [] Pulsed                           []1MHz   []3MHz W/cm2:  Location:    []  Iontophoresis with dexamethasone         Location: [] Take home patch   [] In clinic    []  Ice     []  heat  []  Ice massage  []  Laser   []  Anodyne Position:  Location:    []  Laser with stim  []  Other:  Position:  Location:   10 [x]  Vasopneumatic Device Pressure:       [x] lo [] med [] hi   Temperature: [x] lo [] med [] hi   [] Skin assessment post-treatment:  []intact []redness- no adverse reaction    []redness  adverse reaction:       37 min Therapeutic Exercise:  [x] See flow sheet :   Rationale: increase ROM and increase strength to improve the patients ability to perform ADL       8 min Neuromuscular Re-education:  [x]  See flow sheet : dagmar romberg, MSR, dynamic gait   Rationale: improve balance and increase proprioception  to improve the patients ability to improve ability for ADLs    8 min Manual Therapy:  Patellar mobs, MFR popliteal region, PROM left knee   The manual therapy interventions were performed at a separate and distinct time from the therapeutic activities interventions. Rationale: increase ROM and increase tissue extensibility to improve ability for daily tasks. With   [] TE   [] TA   [] neuro   [] other: Patient Education: [x] Review HEP    [] Progressed/Changed HEP based on:   [] positioning   [] body mechanics   [] transfers   [] heat/ice application    [] other:      Other Objective/Functional Measures: initiated first f/u. AROM post manual lacking 4 degrees to 117 degrees     Pain Level (0-10 scale) post treatment: 6    ASSESSMENT/Changes in Function: The pt tolerated all therex well and gave great effort. Left knee AROM is progressing well. He was instructed in activity modification to avoid increased pain. Patient will continue to benefit from skilled PT services to modify and progress therapeutic interventions, address functional mobility deficits, address ROM deficits, address strength deficits, analyze and address soft tissue restrictions and analyze and cue movement patterns to attain remaining goals. []  See Plan of Care  []  See progress note/recertification  []  See Discharge Summary         Progress towards goals / Updated goals:  Short Term Goals: To be accomplished in 1 weeks:              1.  The pt will be I and compliant with HEP              IE- issued HEP   Current: MET report compliance 3-24-21  Long Term Goals: To be accomplished in 4 weeks:              1. Improve FOTO score to predicted outcome to improve ability for daily tasks              IE- 38              2.  Improve left knee extension AROM to lacking less than 5 degrees for improved gait              IE- lacking 8   Current: progressing well lacking 4 post manual 3-24-21              3. The pt will demonstrate 5/5 left quad strength to improve ability for all activities              IE- 4/5              4. Improve left knee AROM flexion to 120 degrees to improve ability for all activities              IE- 110 degrees   MET- 117 degrees 3-24-21              5.  The pt will report no difficulty with performing light activities at home              IE- quite a bit of difficulty    PLAN  []  Upgrade activities as tolerated     [x]  Continue plan of care  []  Update interventions per flow sheet       []  Discharge due to:_  []  Other:_      Ara Lee, PT 3/24/2021  12:24 PM    Future Appointments   Date Time Provider Jesús Casper   3/25/2021  7:45 AM Aida Rico, PT [facility]   3/26/2021 10:00 AM Srinivas Garcia, PT [facility]   3/29/2021 11:15 AM Deyviine Radha, PT [facility]   3/31/2021 10:00 AM Aida Rico, PT [facility]   4/2/2021 10:00 AM Luisterine Radha, PT [facility]   4/5/2021 10:00 AM Aida Rico, PT [facility]   4/9/2021 10:00 AM Luisterine Radha, PT [facility]   4/12/2021 10:00 AM Aida Rico, PT [facility]   4/16/2021 10:00 AM Luisterine Radha, PT [facility]   12/27/2021  9:40 AM [facility] LEE SCHNEIDER   1/5/2022  1:00 PM MD Ed Hooper

## 2021-03-25 ENCOUNTER — HOSPITAL ENCOUNTER (OUTPATIENT)
Dept: PHYSICAL THERAPY | Age: 76
Discharge: HOME | End: 2021-03-25
Payer: MEDICARE

## 2021-03-25 PROCEDURE — 97140 MANUAL THERAPY 1/> REGIONS: CPT

## 2021-03-25 PROCEDURE — 97112 NEUROMUSCULAR REEDUCATION: CPT

## 2021-03-25 PROCEDURE — 97110 THERAPEUTIC EXERCISES: CPT

## 2021-03-25 PROCEDURE — 97016 VASOPNEUMATIC DEVICE THERAPY: CPT

## 2021-03-25 NOTE — PROGRESS NOTES
PT DAILY TREATMENT NOTE     Patient Name: Tammy Naranjo  Date:3/25/2021  : 1945  [x]  Patient  Verified  Payor: [place] MEDICARE / Plan: [place] MEDICARE PART A & B / Product Type: Medicare /    In [id number]  Out time:859  Total Treatment Time (min): 75  Visit #: 3 of     Medicare/BCBS Only   Total Timed Codes (min):  65 1:1 Treatment Time:  65       Treatment Area: Left knee pain [M25.562]  Post-operative state [Z98.890]    SUBJECTIVE  Pain Level (0-10 scale): 3  Any medication changes, allergies to medications, adverse drug reactions, diagnosis change, or new procedure performed?: [x] No    [] Yes (see summary sheet for update)  Subjective functional status/changes:   [] No changes reported  The pt reports he did well after last session. OBJECTIVE    Modality rationale: decrease edema, decrease inflammation and decrease pain to improve the patients ability to improve ability for daily tasks.     Min Type Additional Details    [] Estim:  []Unatt       []IFC  []Premod                        []Other:  []w/ice   []w/heat  Position:  Location:    [] Estim: []Att    []TENS instruct  []NMES                    []Other:  []w/US   []w/ice   []w/heat  Position:  Location:    []  Traction: [] Cervical       []Lumbar                       [] Prone          []Supine                       []Intermittent   []Continuous Lbs:  [] before manual  [] after manual    []  Ultrasound: []Continuous   [] Pulsed                           []1MHz   []3MHz W/cm2:  Location:    []  Iontophoresis with dexamethasone         Location: [] Take home patch   [] In clinic    []  Ice     []  heat  []  Ice massage  []  Laser   []  Anodyne Position:  Location:    []  Laser with stim  []  Other:  Position:  Location:   10 [x]  Vasopneumatic Device Pressure:       [x] lo [] med [] hi   Temperature: [x] lo [] med [] hi   [] Skin assessment post-treatment:  []intact []redness- no adverse reaction    []redness  adverse reaction:         45 min Therapeutic Exercise:  [x] See flow sheet :   Rationale: increase ROM and increase strength to improve the patients ability to perform ADL    12 min Neuromuscular Re-education:  [x]  See flow sheet :airex romber/MSR, dynamic gait   Rationale: improve balance and increase proprioception  to improve the patients ability to improve ability for daily tasks    8 min Manual Therapy:  Patellar mobs, MFR popliteal/HS, PROM left knee pt supine   The manual therapy interventions were performed at a separate and distinct time from the therapeutic activities interventions. Rationale: decrease pain, increase ROM and increase tissue extensibility to improve functional mobility               With   [] TE   [] TA   [] neuro   [] other: Patient Education: [x] Review HEP    [] Progressed/Changed HEP based on:   [] positioning   [] body mechanics   [] transfers   [] heat/ice application    [] other:      Other Objective/Functional Measures: added SAQ and prone HS curls and sandy step overs. Increased to 6\"step with step ups, increased resistance per flow sheet     Pain Level (0-10 scale) post treatment: 3    ASSESSMENT/Changes in Function: The pt is progressing well with PT and was able to progress with therex today without compliant. Left knee AROM flexion goal has been met and extension is improving. Patient will continue to benefit from skilled PT services to modify and progress therapeutic interventions, address functional mobility deficits, address ROM deficits, address strength deficits, analyze and address soft tissue restrictions and analyze and cue movement patterns to attain remaining goals. []  See Plan of Care  []  See progress note/recertification  []  See Discharge Summary         Progress towards goals / Updated goals:  Short Term Goals: To be accomplished in 1 weeks:              1.  The pt will be I and compliant with HEP              IE- issued HEP              Current: MET report compliance 3-24-21  Long Term Goals: To be accomplished in 4 weeks:              1. Improve FOTO score to predicted outcome to improve ability for daily tasks              IE- 38              2.  Improve left knee extension AROM to lacking less than 5 degrees for improved gait              IE- lacking 8              Current: progressing well lacking 4 post manual 3-24-21              3. The pt will demonstrate 5/5 left quad strength to improve ability for all activities              IE- 4/5              4. Improve left knee AROM flexion to 120 degrees to improve ability for all activities              IE- 110 degrees              MET- 122 degrees 3-25-21              5.  The pt will report no difficulty with performing light activities at home              IE- quite a bit of difficulty    PLAN  []  Upgrade activities as tolerated     [x]  Continue plan of care  []  Update interventions per flow sheet       []  Discharge due to:_  []  Other:_      Marilynn Witt, PT 3/25/2021  7:43 AM    Future Appointments   Date Time Provider Jesús Casper   3/25/2021  7:45 AM Mame Loryon, PT [facility]   3/26/2021 10:00 AM Scot Primmer, PT [facility]   3/29/2021 11:15 AM Scot Primmer, PT [facility]   3/31/2021 10:00 AM Mame Bouillon, PT [facility]   4/2/2021 10:00 AM Scot Primmer, PT [facility]   4/5/2021 10:00 AM Mame Bouillon, PT [facility]   4/9/2021 10:00 AM Scot Primmer, PT [facility]   4/12/2021 10:00 AM Mame Bouillon, PT [facility]   4/16/2021 10:00 AM Scot Primmer, PT [facility]   12/27/2021  9:40 AM [facility] POD1 NURSE [facility] LEE SCHNEIDER   1/5/2022  1:00 PM Shaista Jane MD [address] [facility]

## 2021-03-26 ENCOUNTER — HOSPITAL ENCOUNTER (OUTPATIENT)
Dept: PHYSICAL THERAPY | Age: 76
Discharge: HOME | End: 2021-03-26
Payer: MEDICARE

## 2021-03-26 PROCEDURE — 97112 NEUROMUSCULAR REEDUCATION: CPT

## 2021-03-26 PROCEDURE — 97016 VASOPNEUMATIC DEVICE THERAPY: CPT

## 2021-03-26 PROCEDURE — 97140 MANUAL THERAPY 1/> REGIONS: CPT

## 2021-03-26 PROCEDURE — 97110 THERAPEUTIC EXERCISES: CPT

## 2021-03-26 NOTE — PROGRESS NOTES
PT DAILY TREATMENT NOTE     Patient Name: Renea Anderson  Date:3/26/2021  : 1945  [x]  Patient  Verified  Payor: [place] MEDICARE / Plan: [place] MEDICARE PART A & B / Product Type: Medicare /    In time:10:00  Out time:10:58  Total Treatment Time (min): 62  Visit #: 4 of     Medicare/BCBS Only   Total Timed Codes (min):  48 1:1 Treatment Time:  48       Treatment Area: Left knee pain [M25.562]  Post-operative state [Z98.890]    SUBJECTIVE  Pain Level (0-10 scale): 10  Any medication changes, allergies to medications, adverse drug reactions, diagnosis change, or new procedure performed?: [x] No    [] Yes (see summary sheet for update)  Subjective functional status/changes:   [] No changes reported  The patient states he feels he is doing well. OBJECTIVE  Modality rationale: decrease edema, decrease inflammation and decrease pain to improve the patients ability to improve ADL ease. Min Type Additional Details   10 [x]  Vasopneumatic Device Pressure:       [x] lo [] med [] hi   Temperature: [x] lo [] med [] hi   [] Skin assessment post-treatment:  []intact []redness- no adverse reaction    []redness  adverse reaction:      28 min Therapeutic Exercise:  [x] See flow sheet :   Rationale: increase ROM and increase strength to improve the patients ability to improve ADL ease. 12 min Neuromuscular Re-education:  [x]  See flow sheet : hurdles (small) fwd/lateral activity   Rationale: increase ROM and increase strength  to improve the patients ability to improve ADL ease. 8 min Manual Therapy:  Left knee flexion/extension mobs, patellar mobs, popliteal HS MFR with the patient in supine. The manual therapy interventions were performed at a separate and distinct time from the therapeutic activities interventions. Rationale: decrease pain, increase ROM and increase tissue extensibility to improve ADL ease.          With   [] TE   [] TA   [] neuro   [] other: Patient Education: [x] Review HEP    [] Progressed/Changed HEP based on:   [] positioning   [] body mechanics   [] transfers   [] heat/ice application    [] other:      Other Objective/Functional Measures: The patient ambulates with SPC.  124 degrees flexion active assisted. Pain Level (0-10 scale) post treatment: 5/10    ASSESSMENT/Changes in Function: The patient fully participates in session requiring occasional cues regarding knee and hip position (hip x3) His range of motion is excellent with most limitation slightly notable in extension. Patient will continue to benefit from skilled PT services to modify and progress therapeutic interventions, address functional mobility deficits, address ROM deficits, address strength deficits, analyze and address soft tissue restrictions, analyze and cue movement patterns, analyze and modify body mechanics/ergonomics, assess and modify postural abnormalities, address imbalance/dizziness and instruct in home and community integration to attain remaining goals. []  See Plan of Care  []  See progress note/recertification  []  See Discharge Summary         Progress towards goals / Updated goals:  Short Term Goals: To be accomplished in 1 weeks:              1. The pt will be I and compliant with HEP              IE- issued HEP              ODMPAMZ: MET report compliance 3-24-21  Long Term Goals: To be accomplished in 4 weeks:              1. Improve FOTO score to predicted outcome to improve ability for daily tasks              IE- 38              2.  Improve left knee extension AROM to lacking less than 5 degrees for improved gait              IE- lacking 8              Current: progressing well lacking 4 post manual 3-24-21              3.  The pt will demonstrate 5/5 left quad strength to improve ability for all activities              IE- 4/5              4. Improve left knee AROM flexion to 120 degrees to improve ability for all activities              IE- 110 degrees              MET- 122 degrees 3-25-21              5.  The pt will report no difficulty with performing light activities at home              IE- quite a bit of difficulty    PLAN  []  Upgrade activities as tolerated     [x]  Continue plan of care  []  Update interventions per flow sheet       []  Discharge due to:_  []  Other:_      Luis Lizama, PT 3/26/2021  10:11 AM    Future Appointments   Date Time Provider Jesús Casper   3/29/2021 11:15 AM Dahianajoanna Vázquez, PT [facility]   3/31/2021 10:00 AM Donjeffreye Osmay, PT [facility]   4/2/2021 10:00 AM Dahiana Fanmaribel, PT [facility]   4/5/2021 10:00 AM Donnamarie Osler, PT [facility]   4/9/2021 10:00 AM Dahiana Fanmaribel, PT [facility]   4/12/2021 10:00 AM Donnamarie Osler, PT [facility]   4/16/2021 10:00 AM Dahiana Fanmaribel, PT [facility]   12/27/2021  9:40 AM [facility] POD1 NURSE [facility] LEE SCHED   1/5/2022  1:00 PM Hesham Yadav MD [address] [facility]

## 2021-03-29 ENCOUNTER — HOSPITAL ENCOUNTER (OUTPATIENT)
Dept: PHYSICAL THERAPY | Age: 76
Discharge: HOME | End: 2021-03-29
Payer: MEDICARE

## 2021-03-29 PROCEDURE — 97110 THERAPEUTIC EXERCISES: CPT

## 2021-03-29 PROCEDURE — 97112 NEUROMUSCULAR REEDUCATION: CPT

## 2021-03-29 PROCEDURE — 97140 MANUAL THERAPY 1/> REGIONS: CPT

## 2021-03-29 PROCEDURE — 97016 VASOPNEUMATIC DEVICE THERAPY: CPT

## 2021-03-29 NOTE — PROGRESS NOTES
PT DAILY TREATMENT NOTE     Patient Name: Melinda Needs  Date:3/29/2021  : 1945  [x]  Patient  Verified  Payor: [place] MEDICARE / Plan: [place] MEDICARE PART A & B / Product Type: Medicare /    In time:11:14  Out time:12:11  Total Treatment Time (min): 62  Visit #: 5 of      Medicare/BCBS Only   Total Timed Codes (min):  47 1:1 Treatment Time:  52       Treatment Area: Left knee pain [M25.562]  Post-operative state [Z98.890]    SUBJECTIVE  Pain Level (0-10 scale): 310  Any medication changes, allergies to medications, adverse drug reactions, diagnosis change, or new procedure performed?: [x] No    [] Yes (see summary sheet for update)  Subjective functional status/changes:   [] No changes reported  The patient denies change in symptoms upon arrival and continues to report HEP compliance. OBJECTIVE  Modality rationale: decrease edema to improve the patients ability to improve ADL ease. Min Type Additional Details   10 [x]  Vasopneumatic Device Pressure:       [x] lo [] med [] hi   Temperature: [x] lo [] med [] hi   [] Skin assessment post-treatment:  []intact []redness- no adverse reaction    []redness  adverse reaction:     29 min Therapeutic Exercise:  [] See flow sheet :   Rationale: increase ROM and increase strength to improve the patients ability to improve ADL ease. 10 min Neuromuscular Re-education:  [x]  See flow sheet : modified tandem, FT EC on airex   Rationale: improve coordination, improve balance and increase proprioception  to improve the patients ability to improve ADL ease. 8 min Manual Therapy:  Left knee flexion/extension mobs, patellar mobs, popliteal HS MFR with the patient in supine. The manual therapy interventions were performed at a separate and distinct time from the therapeutic activities interventions. Rationale: decrease pain, increase ROM and increase tissue extensibility to improve ADL ease.         With   [] TE   [] TA   [] neuro   [] other: Patient Education: [x] Review HEP    [] Progressed/Changed HEP based on:   [] positioning   [] body mechanics   [] transfers   [] heat/ice application    [] other:      Other Objective/Functional Measures: Added resistance to hip x 3 with good stability appreciated. Pain Level (0-10 scale) post treatment: 6/10    ASSESSMENT/Changes in Function: Cues required to perform step ups correctly in order to attain adequate step downs with left LE. AROM into left knee extension grossly progressing. The patient left in no distress and thanked PT following session. Patient will continue to benefit from skilled PT services to modify and progress therapeutic interventions, address functional mobility deficits, address ROM deficits, address strength deficits, analyze and address soft tissue restrictions, analyze and cue movement patterns, analyze and modify body mechanics/ergonomics, assess and modify postural abnormalities, address imbalance/dizziness and instruct in home and community integration to attain remaining goals. []  See Plan of Care  []  See progress note/recertification  []  See Discharge Summary         Progress towards goals / Updated goals:  Short Term Goals: To be accomplished in 1 weeks:              1. The pt will be I and compliant with HEP              IE- issued HEP              GCDBRXF: MET report compliance 3-24-21  Long Term Goals: To be accomplished in 4 weeks:              1. Improve FOTO score to predicted outcome to improve ability for daily tasks              IE- 38              2.  Improve left knee extension AROM to lacking less than 5 degrees for improved gait              IE- lacking 8              Current: progressing well lacking 4 post manual 3-24-21              3.  The pt will demonstrate 5/5 left quad strength to improve ability for all activities              IE- 4/5              4. Improve left knee AROM flexion to 120 degrees to improve ability for all activities              IE- 110 degrees              LKN- 366 HNUSXLM 3-25-21              5.  The pt will report no difficulty with performing light activities at home              IE- quite a bit of difficulty    PLAN  []  Upgrade activities as tolerated     [x]  Continue plan of care  []  Update interventions per flow sheet       []  Discharge due to:_  []  Other:_      Latesha Vargas, PT 3/29/2021  11:24 AM    Future Appointments   Date Time Provider Jesús Casper   3/31/2021 10:00 AM Aline Zaragoza, PT [facility]   4/2/2021 10:00 AM Reda Lis, PT [facility]   4/5/2021 10:00 AM Aline Zaragoza, PT [facility]   4/9/2021 10:00 AM Reda Lis, PT [facility]   4/12/2021 10:00 AM Aline Zaragoza, PT [facility]   4/16/2021 10:00 AM Reda Lis, PT [facility]   12/27/2021  9:40 AM [facility] [place] [facility]   1/5/2022  1:00 PM Harold Phoenix, MD [place]

## 2021-03-31 ENCOUNTER — HOSPITAL ENCOUNTER (OUTPATIENT)
Dept: PHYSICAL THERAPY | Age: 76
Discharge: HOME | End: 2021-03-31
Payer: MEDICARE

## 2021-03-31 PROCEDURE — 97016 VASOPNEUMATIC DEVICE THERAPY: CPT

## 2021-03-31 PROCEDURE — 97112 NEUROMUSCULAR REEDUCATION: CPT

## 2021-03-31 PROCEDURE — 97140 MANUAL THERAPY 1/> REGIONS: CPT

## 2021-03-31 PROCEDURE — 97110 THERAPEUTIC EXERCISES: CPT

## 2021-03-31 NOTE — PROGRESS NOTES
PT DAILY TREATMENT NOTE     Patient Name: Dawson Gonzalez  Date:3/31/2021  : 1945  [x]  Patient  Verified  Payor: VA MEDICARE / Plan: VA MEDICARE PART A & B / Product Type: Medicare /    In time:1000  Out time:1105  Total Treatment Time (min): 65  Visit #: 6 of     Medicare/BCBS Only   Total Timed Codes (min):  55 1:1 Treatment Time:  50       Treatment Area: Left knee pain [M25.562]  Post-operative state [Z98.890]    SUBJECTIVE  Pain Level (0-10 scale): 5  Any medication changes, allergies to medications, adverse drug reactions, diagnosis change, or new procedure performed?: [x] No    [] Yes (see summary sheet for update)  Subjective functional status/changes:   [] No changes reported  The pt reports some increased pain today along the medial knee.      OBJECTIVE    Modality rationale: decrease edema, decrease inflammation and decrease pain to improve the patients ability to perform ADL   Min Type Additional Details    [] Estim:  []Unatt       []IFC  []Premod                        []Other:  []w/ice   []w/heat  Position:  Location:    [] Estim: []Att    []TENS instruct  []NMES                    []Other:  []w/US   []w/ice   []w/heat  Position:  Location:    []  Traction: [] Cervical       []Lumbar                       [] Prone          []Supine                       []Intermittent   []Continuous Lbs:  [] before manual  [] after manual    []  Ultrasound: []Continuous   [] Pulsed                           []1MHz   []3MHz W/cm2:  Location:    []  Iontophoresis with dexamethasone         Location: [] Take home patch   [] In clinic    []  Ice     []  heat  []  Ice massage  []  Laser   []  Anodyne Position:  Location:    []  Laser with stim  []  Other:  Position:  Location:   10 [x]  Vasopneumatic Device Pressure:       [x] lo [] med [] hi   Temperature: [x] lo [] med [] hi   [] Skin assessment post-treatment:  []intact []redness- no adverse reaction    []redness  adverse reaction:         37 min Therapeutic Exercise:  [x] See flow sheet :   Rationale: increase ROM and increase strength to improve the patients ability to perform ADL    10 min Neuromuscular Re-education:  [x]  See flow sheet : Romberg, airex   Rationale: improve balance and increase proprioception  to improve the patients ability to perform ADl    8 min Manual Therapy:  STM/MFR popliteal region left knee, patellar mobs, PROM left knee   The manual therapy interventions were performed at a separate and distinct time from the therapeutic activities interventions. Rationale: decrease pain, increase ROM and increase tissue extensibility to improve functional activities        With   [] TE   [] TA   [] neuro   [] other: Patient Education: [x] Review HEP    [] Progressed/Changed HEP based on:   [] positioning   [] body mechanics   [] transfers   [] heat/ice application    [] other:      Other Objective/Functional Measures:  increase wt with LAQ and SAQ     Pain Level (0-10 scale) post treatment: 3    ASSESSMENT/Changes in Function:  The pt is progressing gradually with strength. Extension lacking a few degrees pre manual but improved afterward. Decreased pain noted post treatment. Patient will continue to benefit from skilled PT services to modify and progress therapeutic interventions, address functional mobility deficits, address ROM deficits, address strength deficits and analyze and address soft tissue restrictions to attain remaining goals. []  See Plan of Care  []  See progress note/recertification  []  See Discharge Summary         Progress towards goals / Updated goals:  Short Term Goals: To be accomplished in 1 weeks:              1. The pt will be I and compliant with HEP              IE- issued HEP              OORANVH: MET report compliance 3-24-21  Long Term Goals: To be accomplished in 4 weeks:              1. Improve FOTO score to predicted outcome to improve ability for daily tasks              IE- 38              2.  Improve left knee extension AROM to lacking less than 5 degrees for improved gait              IE- lacking 8              Current: progressing well lacking 4 post manual 3-24-21              3. The pt will demonstrate 5/5 left quad strength to improve ability for all activities              IE- 4/5   Current: 4+/5 3-31-21              4. Improve left knee AROM flexion to 120 degrees to improve ability for all activities              IE- 110 degrees              MET- 122 degrees 3-25-21              5.  The pt will report no difficulty with performing light activities at home              IE- quite a bit of difficulty       PLAN  []  Upgrade activities as tolerated     [x]  Continue plan of care  []  Update interventions per flow sheet       []  Discharge due to:_  []  Other:_      Bruno Jones, PT 3/31/2021  10:07 AM    Future Appointments   Date Time Provider Jesús Casper   4/2/2021 10:00 AM Rena Vázquez, PT [facility]   4/5/2021 10:00 AM Josefina Diggs, PT [facility]   4/9/2021 10:00 AM Kelsey Mccormack, PT [facility]   4/12/2021 10:00 AM Josefina Diggs, PT [facility]   4/16/2021 10:00 AM Kelsey Mccormack, PT [facility]   12/27/2021  9:40 AM [facility] AN[facility] POD1 NURSE [facility] LEE SCHNEIDER   1/5/2022  1:00 PM Nayan Yuan MD [address] [facility]

## 2021-04-02 ENCOUNTER — APPOINTMENT (OUTPATIENT)
Dept: PHYSICAL THERAPY | Age: 76
End: 2021-04-02
Payer: MEDICARE

## 2021-04-05 ENCOUNTER — HOSPITAL ENCOUNTER (OUTPATIENT)
Dept: PHYSICAL THERAPY | Age: 76
Discharge: HOME | End: 2021-04-05
Payer: MEDICARE

## 2021-04-05 PROCEDURE — 97112 NEUROMUSCULAR REEDUCATION: CPT

## 2021-04-05 PROCEDURE — 97140 MANUAL THERAPY 1/> REGIONS: CPT

## 2021-04-05 PROCEDURE — 97110 THERAPEUTIC EXERCISES: CPT

## 2021-04-05 NOTE — PROGRESS NOTES
PT DAILY TREATMENT NOTE     Patient Name: Octavia Lopez  Date:2021  : 1945  [x]  Patient  Verified  Payor: [place] MEDICARE / Plan: [place] MEDICARE PART A & B / Product Type: Medicare /    In time:5  Out time:11:09  Total Treatment Time (min): 47  Visit #: 7 of     Medicare/BCBS Only   Total Timed Codes (min):  44 1:1 Treatment Time:  44       Treatment Area: Left knee pain [M25.562]  Post-operative state [Z98.890]    SUBJECTIVE  Pain Level (0-10 scale): 4  Any medication changes, allergies to medications, adverse drug reactions, diagnosis change, or new procedure performed?: [x] No    [] Yes (see summary sheet for update)  Subjective functional status/changes:   [] No changes reported   The pt reports he is doing well.      OBJECTIVE    Modality rationale: decrease inflammation and decrease pain to improve the patients ability to perform daily tasks   Min Type Additional Details    [] Estim:  []Unatt       []IFC  []Premod                        []Other:  []w/ice   []w/heat  Position:  Location:    [] Estim: []Att    []TENS instruct  []NMES                    []Other:  []w/US   []w/ice   []w/heat  Position:  Location:    []  Traction: [] Cervical       []Lumbar                       [] Prone          []Supine                       []Intermittent   []Continuous Lbs:  [] before manual  [] after manual    []  Ultrasound: []Continuous   [] Pulsed                           []1MHz   []3MHz W/cm2:  Location:    []  Iontophoresis with dexamethasone         Location: [] Take home patch   [] In clinic    []  Ice     []  heat  []  Ice massage  []  Laser   []  Anodyne Position:  Location:    []  Laser with stim  []  Other:  Position:  Location:   10 []  Vasopneumatic Device Pressure:       [x] lo [] med [] hi   Temperature: [x] lo [] med [] hi   [] Skin assessment post-treatment:  []intact []redness- no adverse reaction    []redness  adverse reaction:       28 min Therapeutic Exercise:  [x] See flow sheet : Rationale: increase ROM and increase strength to improve the patients ability to perform ADL       8 min Neuromuscular Re-education:  [x]  See flow sheet : Airex  Balance, TKE quad activation   Rationale: improve coordination, improve balance and increase proprioception  to improve the patients ability to perform ADL. 8 min Manual Therapy:  STM/MFR popliteal region left knee, patellar mobs PROM left knee   The manual therapy interventions were performed at a separate and distinct time from the therapeutic activities interventions. Rationale: increase ROM and increase tissue extensibility to improve functional mobility            With   [] TE   [] TA   [] neuro   [] other: Patient Education: [x] Review HEP    [] Progressed/Changed HEP based on:   [] positioning   [] body mechanics   [] transfers   [] heat/ice application    [] other:      Other Objective/Functional Measures:     Pain Level (0-10 scale) post treatment: 3    ASSESSMENT/Changes in Function: The pt is progressing well with PT. He demonstrates good flexion and was able to attain 0 degrees extension post manual today. Patient will continue to benefit from skilled PT services to modify and progress therapeutic interventions, address functional mobility deficits, address ROM deficits, address strength deficits, analyze and address soft tissue restrictions and analyze and cue movement patterns to attain remaining goals. []  See Plan of Care  []  See progress note/recertification  []  See Discharge Summary         Progress towards goals / Updated goals:  Short Term Goals: To be accomplished in 1 weeks:              1. The pt will be I and compliant with HEP              IE- issued HEP              ONOALIY: MET report compliance 3-24-21  Long Term Goals: To be accomplished in 4 weeks:              1.  Improve FOTO score to predicted outcome to improve ability for daily tasks              IE- 38              2.  Improve left knee extension AROM to lacking less than 5 degrees for improved gait              IE- lacking 8              Current: 0 degrees post manual 4-5-21              3. The pt will demonstrate 5/5 left quad strength to improve ability for all activities              IE- 4/5              Current: 4+/5 3-31-21              4. Improve left knee AROM flexion to 120 degrees to improve ability for all activities              IE- 110 degrees              MET- 124 degrees 4-5-21              5.  The pt will report no difficulty with performing light activities at home              IE- quite a bit of difficulty       PLAN  []  Upgrade activities as tolerated     [x]  Continue plan of care  []  Update interventions per flow sheet       []  Discharge due to:_  []  Other:_      Uriah Mcduffie, PT 4/5/2021  10:27 AM    Future Appointments   Date Time Provider Jesús Casper   4/9/2021 10:00 AM Lorenso Dakins S, PT [facility]   4/12/2021 10:00 AM Esme Contreras, PT [facility]   4/16/2021 10:00 AM Nando Jackman, PT [facility]   12/27/2021  9:40 AM [facility] ANITA POD1 NURSE [facility] LEE [facility]   1/5/2022  1:00 PM Ranjan Bear MD [address] [facility]

## 2021-04-09 ENCOUNTER — HOSPITAL ENCOUNTER (OUTPATIENT)
Dept: PHYSICAL THERAPY | Age: 76
Discharge: HOME | End: 2021-04-09
Payer: MEDICARE

## 2021-04-09 PROCEDURE — 97140 MANUAL THERAPY 1/> REGIONS: CPT

## 2021-04-09 PROCEDURE — 97112 NEUROMUSCULAR REEDUCATION: CPT

## 2021-04-09 PROCEDURE — 97110 THERAPEUTIC EXERCISES: CPT

## 2021-04-09 NOTE — PROGRESS NOTES
PT DAILY TREATMENT NOTE     Patient Name: Dawson Gonzalez  Date:2021  : 1945  [x]  Patient  Verified  Payor: VA MEDICARE / Plan: VA MEDICARE PART A & B / Product Type: Medicare /    In time:10:00  Out time:11:48  Total Treatment Time (min): 48  Visit #: 8 of     Medicare/BCBS Only   Total Timed Codes (min):  48 1:1 Treatment Time: 48       Treatment Area: Left knee pain [M25.562]  Post-operative state [Z98.890]    SUBJECTIVE  Pain Level (0-10 scale): 3/10  Any medication changes, allergies to medications, adverse drug reactions, diagnosis change, or new procedure performed?: [x] No    [] Yes (see summary sheet for update)  Subjective functional status/changes:   [] No changes reported  The patient reports that his knee has been doing well. OBJECTIVE  25 min Therapeutic Exercise:  [] See flow sheet :   Rationale: increase ROM and increase strength to improve the patients ability to improve ADL ease. 15 min Neuromuscular Re-education:  [x]  See flow sheet :   Rationale: increase strength, improve coordination, improve balance and increase proprioception  to improve the patients ability to improve ADL ease. 8 min Manual Therapy:  Left tibiofemoral extension/flexion mobs grade III with emphasis of extension. The manual therapy interventions were performed at a separate and distinct time from the therapeutic activities interventions. Rationale: decrease pain, increase ROM and increase tissue extensibility to improve ADL ease.             With   [] TE   [] TA   [] neuro   [] other: Patient Education: [x] Review HEP    [] Progressed/Changed HEP based on:   [] positioning   [] body mechanics   [] transfers   [] heat/ice application    [] other:      Other Objective/Functional Measures:   Lacking 2 degrees of left knee extension passively post manual    Pain Level (0-10 scale) post treatment: 2/10    ASSESSMENT/Changes in Function: The patient demonstrates good progress regarding his ambulation efficiency and mobility. He will continue to benefit continue progress ease of stair and balance. Patient will continue to benefit from skilled PT services to modify and progress therapeutic interventions, address functional mobility deficits, address ROM deficits, address strength deficits, analyze and address soft tissue restrictions, analyze and cue movement patterns, analyze and modify body mechanics/ergonomics, assess and modify postural abnormalities and instruct in home and community integration to attain remaining goals. []  See Plan of Care  []  See progress note/recertification  []  See Discharge Summary         Progress towards goals / Updated goals:  Short Term Goals: To be accomplished in 1 weeks:              1. The pt will be I and compliant with [facility]              IE- issued HEP              ZFFYXRJ: MET report compliance 3-24-21  Long Term Goals: To be accomplished in 4 weeks:              1. Improve FOTO score to predicted outcome to improve ability for daily tasks              IE- 38              2.  Improve left knee extension AROM to lacking less than 5 degrees for improved gait              IE- lacking 8              Current: 0 degrees post manual 4-5-21              3. The pt will demonstrate 5/5 left quad strength to improve ability for all activities              IE- 4/5              Current: 4+/5 3-31-21              4. Improve left knee AROM flexion to 120 degrees to improve ability for all activities              IE- 110 degrees              MET- 124 degrees 4-5-21              5.  The pt will report no difficulty with performing light activities at home              IE- quite a bit of difficulty    PLAN  []  Upgrade activities as tolerated     [x]  Continue plan of care  []  Update interventions per flow sheet       []  Discharge due to:_  []  Other:_      José Solomon, PT 4/9/2021  10:20 AM    Future Appointments   Date Time Provider Jesús Casper   4/12/2021 10:00 AM Dyana Alicea PT [facility]   4/16/2021 10:00 AM Giacomo Brunner, PT [facility]   12/27/2021  9:40 AM [facility] ANITA POD1 NURSE [facility]   1/5/2022  1:00 PM Mike Howe MD [address] [facility]

## 2021-04-12 ENCOUNTER — HOSPITAL ENCOUNTER (OUTPATIENT)
Dept: PHYSICAL THERAPY | Age: 76
Discharge: HOME | End: 2021-04-12
Payer: MEDICARE

## 2021-04-12 PROCEDURE — 97140 MANUAL THERAPY 1/> REGIONS: CPT

## 2021-04-12 PROCEDURE — 97112 NEUROMUSCULAR REEDUCATION: CPT

## 2021-04-12 PROCEDURE — 97110 THERAPEUTIC EXERCISES: CPT

## 2021-04-12 NOTE — PROGRESS NOTES
PT DAILY TREATMENT NOTE     Patient Name: Dawson Gonzalez  Date:2021  : 1945  [x]  Patient  Verified  Payor: VA MEDICARE / Plan: VA MEDICARE PART A & B / Product Type: Medicare /    In time:  Out time:1053  Total Treatment Time (min): 51  Visit #: 9 of     Medicare/BCBS Only   Total Timed Codes (min):  51 1:1 Treatment Time:  43       Treatment Area: Left knee pain [M25.562]  Post-operative state [Z98.890]    SUBJECTIVE  Pain Level (0-10 scale): 2-3  Any medication changes, allergies to medications, adverse drug reactions, diagnosis change, or new procedure performed?: [x] No    [] Yes (see summary sheet for update)  Subjective functional status/changes:   [] No changes reported  The pt reports he is doing well    OBJECTIVE    Modality rationale:    Min Type Additional Details    [] Estim:  []Unatt       []IFC  []Premod                        []Other:  []w/ice   []w/heat  Position:  Location:    [] Estim: []Att    []TENS instruct  []NMES                    []Other:  []w/US   []w/ice   []w/heat  Position:  Location:    []  Traction: [] Cervical       []Lumbar                       [] Prone          []Supine                       []Intermittent   []Continuous Lbs:  [] before manual  [] after manual    []  Ultrasound: []Continuous   [] Pulsed                           []1MHz   []3MHz W/cm2:  Location:    []  Iontophoresis with dexamethasone         Location: [] Take home patch   [] In clinic    []  Ice     []  heat  []  Ice massage  []  Laser   []  Anodyne Position:  Location:    []  Laser with stim  []  Other:  Position:  Location:    []  Vasopneumatic Device Pressure:       [] lo [] med [] hi   Temperature: [] lo [] med [] hi   [] Skin assessment post-treatment:  []intact []redness- no adverse reaction    []redness  adverse reaction:     28 min Therapeutic Exercise:  [x] See flow sheet :   Rationale: increase ROM and increase strength to improve the patients ability to perform ADL     15 min Neuromuscular Re-education:  [x]  See flow sheet :   Rationale: improve coordination, improve balance and increase proprioception  to improve the patients ability to perform functional tasks. 8 min Manual Therapy:  Left tib-fem extension mobs, MFR popliteal region left knee. Pt supine   The manual therapy interventions were performed at a separate and distinct time from the therapeutic activities interventions. Rationale: decrease pain, increase ROM and increase tissue extensibility to improve functional mobliity            With   [] TE   [] TA   [] neuro   [] other: Patient Education: [x] Review HEP    [] Progressed/Changed HEP based on:   [] positioning   [] body mechanics   [] transfers   [] heat/ice application    [] other:      Other Objective/Functional Measures: increased resistance with LAQ and SAQ. Increased reps with Shuttle Press     Pain Level (0-10 scale) post treatment: 2    ASSESSMENT/Changes in Function: The pt progressed with therex as noted above without difficulty. He does lack slight extension of the left knee prior to treatment but is able to attain neutral extension following manual.     Patient will continue to benefit from skilled PT services to modify and progress therapeutic interventions, address functional mobility deficits, address ROM deficits, address strength deficits, analyze and address soft tissue restrictions and analyze and cue movement patterns to attain remaining goals. []  See Plan of Care  []  See progress note/recertification  []  See Discharge Summary         Progress towards goals / Updated goals:  Short Term Goals: To be accomplished in 1 weeks:              1. The pt will be I and compliant with HEP              IE- issued HEP              MSPBOTA: MET report compliance 3-24-21  Long Term Goals: To be accomplished in 4 weeks:              1. Improve FOTO score to predicted outcome to improve ability for daily tasks              IE- 38              2.  Improve left knee extension AROM to lacking less than 5 degrees for improved gait              IE- lacking 8              Current: 0 degrees post manual 4-5-21              3. The pt will demonstrate 5/5 left quad strength to improve ability for all activities              IE- 4/5              Current: 4+/5 3-31-21              4. Improve left knee AROM flexion to 120 degrees to improve ability for all activities              IE- 110 degrees              MET- 124 degrees 4-5-21              5.  The pt will report no difficulty with performing light activities at home              IE- quite a bit of difficulty    PLAN  []  Upgrade activities as tolerated     [x]  Continue plan of care  []  Update interventions per flow sheet       []  Discharge due to:_  []  Other:_      Obey Rayo PT 4/12/2021  10:55 AM    Future Appointments   Date Time Provider Jesús Casper   4/16/2021 10:00 AM Author Langston PT [facility]   12/27/2021  9:40 AM [facility] POD1 NURSE [facility] LEE [facility]   1/5/2022  1:00 PM Sg Victoria MD [address] [facility]

## 2021-04-16 ENCOUNTER — HOSPITAL ENCOUNTER (OUTPATIENT)
Dept: PHYSICAL THERAPY | Age: 76
Discharge: HOME | End: 2021-04-16
Payer: MEDICARE

## 2021-04-16 PROCEDURE — 97110 THERAPEUTIC EXERCISES: CPT

## 2021-04-16 PROCEDURE — 97112 NEUROMUSCULAR REEDUCATION: CPT

## 2021-04-16 PROCEDURE — 97140 MANUAL THERAPY 1/> REGIONS: CPT

## 2021-04-16 NOTE — PROGRESS NOTES
[facility]  [address]  [place], [address]  [phone] fax    Continued Plan of Care/ Re-certification for Physical Therapy Services    Patient name: Luisa Andrew Start of Care: 3/17/2021   Referral source: Crystal Lathamo, * : 1945               Medical Diagnosis: Left knee pain [M25.562]  Post-operative state [Z98.890]  Payor: Tyler Maddock / Plan: VA MEDICARE PART A & B / Product Type: Medicare /  Onset AQZY:70               Treatment Diagnosis: left knee pain s/p TKA   Prior Hospitalization: see medical history Provider#: [id number]   Medications: Verified on Patient summary List    Comorbidities: OA, HTN   Prior Level of Function: functionally I with daily asks. Works out with a    Visits from [place] of Care: 10    Missed Visits: 0    The Plan of Care and following information is based on the patient's current status:  Short Term Goals: To be accomplished in [address] [place] [address]:              1. The pt will be I and compliant with HEP              IE- issued HEP              JQGCHPD: MET report compliance 3-24-21  Long Term Goals: To be accomplished in 4 weeks:              1. Improve FOTO score to predicted outcome to improve ability for daily tasks              IE- 38               Current: Progressed to 46              2.  Improve left knee extension AROM to lacking less than 5 degrees for improved gait              IE- lacking 8              Current: Lacking 3 degrees from neutral 2021              3. The pt will demonstrate 5/5 left quad strength to improve ability for all activities              IE- 4/5              Current: Met 5/5 MMT              4. Improve left knee AROM flexion to 120 degrees to improve ability for all activities              IE- 110 degrees              MET- 124 degrees 21              5.  The pt will report no difficulty with performing light activities at home              IE- quite a bit of difficulty              Current: Improved to moderate difficulty 4/16/2021    Key functional changes: Significant improvement in functional chore ease per FOTO score, improved extension and flexion range of motion. Problems/ barriers to goal attainment: None     Problem List: pain affecting function, decrease ROM, decrease strength, edema affecting function, impaired gait/ balance, decrease ADL/ functional abilitiies, decrease activity tolerance, decrease flexibility/ joint mobility and decrease transfer abilities    Treatment Plan: Therapeutic exercise, Therapeutic activities, Neuromuscular re-education, Physical agent/modality, Gait/balance training, Manual therapy, Patient education, Self Care training, Functional mobility training, Home safety training and Stair training     Patient Goal (s) has been updated and includes: less pain and better mobility     Goals for this certification period to be accomplished in 2 weeks:   1. Improve FOTO score to predicted outcome to improve ability for daily tasks              IE- 38               Re-cert: Progressed to 46  2. The patient will demonstrate neutral extension of right knee to improve efficiency of gait. Re-cert: lacking 3 degrees  3. The pt will report no difficulty with performing light activities at home              IE- quite a bit of difficulty             Re-cert: Improved to moderate difficulty 4/16/2021    Frequency / Duration: Patient to be seen 1 times per week for 2-3 weeks:    Assessment / Recommendations: The patient has made excellent progress through his first round of PT nearly meeting extension range of motion goals (lacking 3 degrees), and easily attaining flexion range of motion goals. The patient is ambulating with improved ease and does have minor balance limitations requiring a SPC for community ambulation.  He will benefit from 2 more sessions at a reduced frequency of once a week to attempt to normalize knee extension and maximize balance and strength prior to progression to HEP and D/C. He is in agreement with this plan and leaves the clinic thanking PT and was in no apparent distress.        Certification Period: 4/16/21 - 5/15/2021    Billy Ramos, PT 4/16/2021 10:50 AM    ________________________________________________________________________  I certify that the above Therapy Services are being furnished while the patient is under my care. I agree with the treatment plan and certify that this therapy is necessary. [] I have read the above and request that my patient continue as recommended.   [] I have read the above report and request that my patient continue therapy with the following changes/special instructions: _____________________________________________  [] I have read the above report and request that my patient be discharged from therapy    Physician's Signature:____________Date:_________TIME:________     Taylor Palma, *  ** Signature, Date and Time must be completed for valid certification **    Please sign and return to [facility] [address] [place] [address] Farzana Alfie Mckenna [address] [place] [address], [facility] [address]  [phone] fax

## 2021-04-16 NOTE — PROGRESS NOTES
PT DAILY TREATMENT NOTE     Patient Name: Darin Phan  Date:2021  : 1945  [x]  Patient  Verified  Payor: [place] MEDICARE / Plan: [place] MEDICARE PART A & B / Product Type: Medicare /    In time:9:58  Out time:10:46  Total Treatment Time (min): 48  Visit #: 10 of     Medicare/BCBS Only   Total Timed Codes (min):  48 1:1 Treatment Time:  48       Treatment Area: Left knee pain [M25.562]  Post-operative state [Z98.890]    SUBJECTIVE  Pain Level (0-10 scale): 3/10  Any medication changes, allergies to medications, adverse drug reactions, diagnosis change, or new procedure performed?: [x] No    [] Yes (see summary sheet for update)  Subjective functional status/changes:   [] No changes reported  The patient reports that he was able to increase his biking and walking. He continues to report lower grade pain and states this is one of his chief complaints. OBJECTIVE  25 min Therapeutic Exercise:  [x] See flow sheet :   Rationale: increase ROM and increase strength to improve the patients ability to improve ADL ease. 15 min Neuromuscular Re-education:  [x]  See flow sheet :   Rationale: increase ROM and increase strength  to improve the patients ability to improve ADL ease. 8 min Manual Therapy:  Left tib-fem extension mobs, MFR popliteal region left knee. Pt supine   The manual therapy interventions were performed at a separate and distinct time from the therapeutic activities interventions. Rationale: decrease pain, increase ROM and increase tissue extensibility to improve ADL ease. With   [] TE   [] TA   [] neuro   [] other: Patient Education: [x] Review HEP    [] Progressed/Changed HEP based on:   [] positioning   [] body mechanics   [] transfers   [] heat/ice application    [] other:      Other Objective/Functional Measures:    FOTO: 47  Quad strength: 5/5 MMT    Pain Level (0-10 scale) post treatment: 2/10    ASSESSMENT/Changes in Function: The patient has made excellent progress through his first round of PT nearly meeting extension range of motion goals (lacking 3 degrees), and easily attaining flexion range of motion goals. The patient is ambulating with improved ease and does have minor balance limitations requiring a SPC for community ambulation. He will benefit from 2 more sessions at a reduced frequency of once a week to attempt to normalize knee extension and maximize balance and strength prior to progression to HEP and D/C. He is in agreement with this plan and leaves the clinic thanking PT and was in no apparent distress. Patient will continue to benefit from skilled PT services to modify and progress therapeutic interventions, address functional mobility deficits, address ROM deficits, address strength deficits, analyze and address soft tissue restrictions, analyze and cue movement patterns, analyze and modify body mechanics/ergonomics, assess and modify postural abnormalities and instruct in home and community integration to attain remaining goals. []  See Plan of Care  []  See progress note/recertification  []  See Discharge Summary         Progress towards goals / Updated goals:  Short Term Goals: To be accomplished in 1 weeks:              1. The pt will be I and compliant with [facility]              IE- issued HEP              LASHONDAE: MET report compliance 3-24-21  Long Term Goals: To be accomplished in 4 weeks:              1. Improve FOTO score to predicted outcome to improve ability for daily tasks              IE- 38    Current: Progressed to 46              2.  Improve left knee extension AROM to lacking less than 5 degrees for improved gait              IE- lacking 8              Current: Lacking 3 degrees from neutral 4/16/2021              3.  The pt will demonstrate 5/5 left quad strength to improve ability for all activities              IE- 4/5              Current: Met 5/5 MMT              4. Improve left knee AROM flexion to 120 degrees to improve ability for all activities              JQ- 110 degrees              RQE- [facility] [facility]              5.  The pt will report no difficulty with performing light activities at home              IE- quite a bit of difficulty   Current: Improved to moderate difficulty 4/16/2021    PLAN  []  Upgrade activities as tolerated     [x]  Continue plan of care  []  Update interventions per flow sheet       []  Discharge due to:_  []  Other:_      Dawit Woo, PT 4/16/2021  10:07 AM    Future Appointments   Date Time Provider Jesús Casper   12/27/2021  9:40 AM [facility] POD1 NURSE [facility] LEE JENNIE   1/5/2022  1:00 PM Edilson Cunningham MD [address] [facility]

## 2021-04-27 ENCOUNTER — HOSPITAL ENCOUNTER (OUTPATIENT)
Dept: PHYSICAL THERAPY | Age: 76
Discharge: HOME | End: 2021-04-27
Payer: MEDICARE

## 2021-04-27 PROCEDURE — 97110 THERAPEUTIC EXERCISES: CPT

## 2021-04-27 PROCEDURE — 97112 NEUROMUSCULAR REEDUCATION: CPT

## 2021-04-27 PROCEDURE — 97140 MANUAL THERAPY 1/> REGIONS: CPT

## 2021-04-27 NOTE — PROGRESS NOTES
PT DAILY TREATMENT NOTE     Patient Name: Annette Hunter  Date:2021  : 1945  [x]  Patient  Verified  Payor: VA MEDICARE / Plan: VA MEDICARE PART A & B / Product Type: Medicare /    In time:12:45  Out time:1:26  Total Treatment Time (min): 41  Visit #: 1 of 2    Medicare/BCBS Only   Total Timed Codes (min):  41 1:1 Treatment Time:  41       Treatment Area: Left knee pain [M25.562]  Post-operative state [Z98.890]    SUBJECTIVE  Pain Level (0-10 scale): 3/10  Any medication changes, allergies to medications, adverse drug reactions, diagnosis change, or new procedure performed?: [x] No    [] Yes (see summary sheet for update)  Subjective functional status/changes:   [] No changes reported  The patient reports that he does continue to experience pain of his knee and reports it feels like an ache. OBJECTIVE  24 min Therapeutic Exercise:  [x] See flow sheet :   Rationale: increase ROM and increase strength to improve the patients ability to improve ADL ease. 9 min Neuromuscular Re-education:  [x]  See flow sheet :   Rationale: improve coordination, improve balance and increase proprioception  to improve the patients ability to improve ADL ease. 8 min Manual Therapy:  Left tibiofemoral joint extension mobs grade III to IV   The manual therapy interventions were performed at a separate and distinct time from the therapeutic activities interventions. Rationale: decrease pain, increase ROM and increase tissue extensibility to improve ADL ease. With   [] TE   [] TA   [] neuro   [] other: Patient Education: [x] Review HEP    [] Progressed/Changed HEP based on:   [] positioning   [] body mechanics   [] transfers   [] heat/ice application    [] other:      Other Objective/Functional Measures:   Able to attain 0 degrees post session. Pain Level (0-10 scale) post treatment: 4/10    ASSESSMENT/Changes in Function: The patient improved left knee extension to 0 degrees post session.  He has been instructed to continue emphasizing extension prop. No significant pain increase post session. Patient will continue to benefit from skilled PT services to modify and progress therapeutic interventions, address functional mobility deficits, address ROM deficits, address strength deficits, analyze and address soft tissue restrictions, analyze and cue movement patterns, analyze and modify body mechanics/ergonomics, assess and modify postural abnormalities and instruct in home and community integration to attain remaining goals. []  See Plan of Care  []  See progress note/recertification  []  See Discharge Summary         Progress towards goals / Updated goals:   1. Improve FOTO score to predicted outcome to improve ability for daily tasks              IE- 38               Re-cert: Progressed to 46  2. The patient will demonstrate neutral extension of right knee to improve efficiency of gait. Re-cert: lacking 3 degrees   Current: Met post session 4/27/2021  3.  The pt will report no difficulty with performing light activities at home              IE- quite a bit of difficulty             Re-cert: Improved to moderate difficulty 4/16/2021    PLAN  []  Upgrade activities as tolerated     [x]  Continue plan of care  []  Update interventions per flow sheet       []  Discharge due to:_  []  Other:_      Esperanza Malloy, PT 4/27/2021  1:45 PM    Future Appointments   Date Time Provider Jesús Casper   5/4/2021 12:45 PM Mat García, PT [id number]   5/11/2021 11:30 AM Adri Granados MD [facility] BS AMB   12/27/2021  9:40 AM [facility] ANITA POD1 NURSE [facility] LEE SCHED   1/5/2022  1:00 PM [facility]

## 2021-05-04 ENCOUNTER — HOSPITAL ENCOUNTER (OUTPATIENT)
Dept: PHYSICAL THERAPY | Age: 76
Discharge: HOME | End: 2021-05-04
Payer: MEDICARE

## 2021-05-04 PROCEDURE — 97140 MANUAL THERAPY 1/> REGIONS: CPT

## 2021-05-04 PROCEDURE — 97112 NEUROMUSCULAR REEDUCATION: CPT

## 2021-05-04 PROCEDURE — 97110 THERAPEUTIC EXERCISES: CPT

## 2021-05-04 NOTE — PROGRESS NOTES
[facility]  [address]  [place], [address]  [phone] fax    Physical Therapy Discharge Summary    Patient name: Steffen De La Garza Start of Care: 3/17/2021   Referral source: Saurabh Bolanos, * : 1945               Medical Diagnosis: Left knee pain [M25.562]  Post-operative state [Z98.890]  Payor: Jensen Perez / Plan: VA MEDICARE PART A & B / Product Type: Medicare /  Onset MIOD:4-3-96               Treatment Diagnosis: left knee pain s/p TKA   Prior Hospitalization: see medical history Provider#: [id number]   Medications: Verified on Patient summary List    Comorbidities: OA, HTN   Prior Level of Function: functionally I with daily asks. Works out with a    Visits from [place] of Care: 12    Missed Visits: 0  Reporting Period : 2021 to 2021    Summary of Care:   1. Improve FOTO score to predicted outcome to improve ability for daily tasks              IE- 38               Re-cert: Progressed to 46   D/C Not met 45  2. The patient will demonstrate neutral extension of right knee to improve efficiency of gait.              FE-PGWW: lacking 3 degrees              Current: Met post session 2021   Current: Met 0 degrees 2021  3. The pt will report no difficulty with performing light activities at home              IE- quite a bit of difficulty             Re-cert: Improved to moderate difficulty 2021   Current: Continues to report moderate difficulty        ASSESSMENT/RECOMMENDATIONS: The patient has progressed well towards all goals with exception of FOTO. The patient did not meet his FOTO score, but feels he is able to complete all relevant tasks and is quite pleased with his progress. The patient is in agreement of D/C at end of session.      [x]Discontinue therapy: [x]Patient has reached or is progressing toward set goals      []Patient is non-compliant or has abdicated      []Due to lack of appreciable progress towards set [address], PT 5/4/2021 2:01 PM

## 2021-05-04 NOTE — PROGRESS NOTES
PT DAILY TREATMENT NOTE     Patient Name: Steffen De La Garza  Date:2021  : 1945  [x]  Patient  Verified  Payor: VA MEDICARE / Plan: VA MEDICARE PART A & B / Product Type: Medicare /    In time:12:45  Out time:1:33  Total Treatment Time (min): 48  Visit #: 2 of 2    Medicare/BCBS Only   Total Timed Codes (min):  48 1:1 Treatment Time:  48        Treatment Area: Left knee pain [M25.562]  Post-operative state [Z98.890]    SUBJECTIVE  Pain Level (0-10 scale): 2/10  Any medication changes, allergies to medications, adverse drug reactions, diagnosis change, or new procedure performed?: [x] No    [] Yes (see summary sheet for update)  Subjective functional status/changes:   [] No changes reported  The patient states that he was able to sleep last night without too much problem     OBJECTIVE  28 min Therapeutic Exercise:  [x] See flow sheet :   Rationale: increase ROM and increase strength to improve the patients ability to improve ADL ease. 12 min Neuromuscular Re-education:  [x]  See flow sheet :   Rationale: improve coordination, improve balance and increase proprioception  to improve the patients ability to improve ADL ease. 8 min Manual Therapy:  Left knee extension mobs grade IV. The manual therapy interventions were performed at a separate and distinct time from the therapeutic activities interventions. Rationale: decrease pain, increase ROM and increase tissue extensibility to improve ADL ease. With   [] TE   [] TA   [] neuro   [] other: Patient Education: [x] Review HEP    [] Progressed/Changed HEP based on:   [] positioning   [] body mechanics   [] transfers   [] heat/ice application    [] other:      Other Objective/Functional Measures:   Left knee: 0 - 128 degrees left knee     FOTO: 45    Pain Level (0-10 scale) post treatment: 1/10    ASSESSMENT/Changes in Function: The patient has progressed well towards all goals with exception of FOTO.  The patient did not meet his FOTO score, but feels he is able to complete all relevant tasks and is quite pleased with his progress. The patient is in agreement of D/C at end of session. []  See Plan of Care  []  See progress note/recertification  [x]  See Discharge Summary         Progress towards goals / Updated goals:   1. Improve FOTO score to predicted outcome to improve ability for daily tasks              IE- 38               Re-cert: Progressed to 46  2. The patient will demonstrate neutral extension of right knee to improve efficiency of gait.              Re-cert: lacking 3 degrees              Current: Met post session 4/27/2021  3.  The pt will report no difficulty with performing light activities at home              IE- quite a bit of difficulty             Re-cert: Improved to moderate difficulty 4/16/2021    PLAN  []  Upgrade activities as tolerated     []  Continue plan of care  []  Update interventions per flow sheet       [x]  Discharge due to:_  []  Other:_      Brenton Girard, PT 5/4/2021  1:32 PM    Future Appointments   Date Time Provider Jesús Casper   5/11/2021 11:30 AM Mikey Kim MD [facility] BS AMB   12/27/2021  9:40 AM [facility] POD[facility]SE [facility] LEE SCHED   1/5/2022  1:00 PM Aquilino Panchal MD [address] [facility]

## 2021-05-11 ENCOUNTER — OFFICE VISIT (OUTPATIENT)
Dept: INTERNAL MEDICINE CLINIC | Age: 76
End: 2021-05-11
Payer: MEDICARE

## 2021-05-11 VITALS
DIASTOLIC BLOOD PRESSURE: 76 MMHG | BODY MASS INDEX: 28.9 KG/M2 | HEART RATE: 69 BPM | HEIGHT: 71 IN | OXYGEN SATURATION: 97 % | RESPIRATION RATE: 12 BRPM | WEIGHT: 206.4 LBS | TEMPERATURE: 98.6 F | SYSTOLIC BLOOD PRESSURE: 128 MMHG

## 2021-05-11 DIAGNOSIS — I10 ESSENTIAL HYPERTENSION: Primary | ICD-10-CM

## 2021-05-11 DIAGNOSIS — E78.00 PURE HYPERCHOLESTEROLEMIA: ICD-10-CM

## 2021-05-11 PROCEDURE — 99214 OFFICE O/P EST MOD 30 MIN: CPT | Performed by: INTERNAL MEDICINE

## 2021-05-11 PROCEDURE — G0463 HOSPITAL OUTPT CLINIC VISIT: HCPCS | Performed by: INTERNAL MEDICINE

## 2021-05-11 NOTE — PROGRESS NOTES
1. Have you been to the ER, urgent care clinic or hospitalized since your last visit? NO           2. Have you seen or consulted any other health care providers outside of the [address] [place] [address] since your last visit (Include any pap smears or colon screening)? YES, [facility], Dr. Meliza Smith you have an Advanced Directive? YES    Would you like information on Advanced Directives?  NO

## 2021-05-11 NOTE — PROGRESS NOTES
JEAN     Michael Alcantar is a 48-year-old male, known to me from my previous practice. Since I last saw him, he had a total knee replacement on the left, and is doing well. He occasionally has to take up to 4 extra strength Tylenol daily, very rarely uses Advil if the Tylenol does not work. He believes he had a colonoscopy 3 years ago in [facility], says it was normal, but does not remember the name of the gastroenterologist who did the procedure. He reports that before his total knee replacement, he was seen at my previous practice and had an EKG done, which was reported as abnormal, and was repeated. He states the last EKG was fine. Past Medical History:   Diagnosis Date    GERD (gastroesophageal reflux disease)     HLD (hyperlipidemia)     Hypertension     Tubular adenoma of colon         Past Surgical History:   Procedure Laterality Date    HX KNEE REPLACEMENT Left     HX LAP CHOLECYSTECTOMY      NEUROLOGICAL PROCEDURE UNLISTED      cyst removed from lower back above tail bone        Current Outpatient Medications   Medication Sig Dispense Refill    pantoprazole (PROTONIX) 40 mg tablet       losartan (COZAAR) 100 mg tablet       CRESTOR 10 mg tablet Take 10 mg by mouth daily.           No Known Allergies     Social History     Socioeconomic History    Marital status:      Spouse name: Not on file    Number of children: Not on file    Years of education: Not on file    Highest education level: Not on file   Occupational History    Not on file   Social Needs    Financial resource strain: Not on file    Food insecurity     Worry: Not on file     Inability: Not on file    Transportation needs     Medical: Not on file     Non-medical: Not on file   Tobacco Use    Smoking status: Former Smoker    Smokeless tobacco: Former User   Substance and Sexual Activity    Alcohol use: Yes     Comment: occ    Drug use: No    Sexual activity: Not on file   Lifestyle    Physical activity Days per week: Not on file     Minutes per session: Not on file    Stress: Not on file   Relationships    Social connections     Talks on phone: Not on file     Gets together: Not on file     Attends Voodoo service: Not on file     Active member of club or organization: Not on file     Attends meetings of clubs or organizations: Not on file     Relationship status: Not on file    Intimate partner violence     Fear of current or ex partner: Not on file     Emotionally abused: Not on file     Physically abused: Not on file     Forced sexual activity: Not on file   Other Topics Concern    Not on file   Social History Narrative    Not on file        Family History   Problem Relation Age of Onset    Alzheimer Mother          at age 80    Lung Disease Father         , smoker--  at 72    Other Sister         Stephanie Bennett    Thyroid Cancer Daughter     Other Son         low testosterone           Visit Vitals  /76 (BP 1 Location: Right upper arm, BP Patient Position: Sitting, BP Cuff Size: Large adult)   Pulse 69   Temp 98.6 °F (37 °C) (Temporal)   Resp 12   Ht 5' 11\" (1.803 m)   Wt 206 lb 6.4 oz (93.6 kg)   SpO2 97%   BMI 28.79 kg/m²        Review of Systems   Constitutional: Negative for chills and fever. Eyes: Negative for blurred vision. Respiratory: Negative for shortness of breath. Cardiovascular: Negative for chest pain, orthopnea and PND. Gastrointestinal: Negative for blood in stool. Neurological: Negative for headaches. Psychiatric/Behavioral: Negative for depression. The patient is not nervous/anxious. Physical Exam     General: He is overweight, in no acute distress.   HEENT: There is no icterus or pallor  Neck: Supple, no decrease carotid upstrokes  Cor: Regular rate and rhythm with 2/6 systolic murmur, no rubs or gallops  Lungs: Clear to auscultation, speaks full sentences easily, good air movement bilaterally  Abdomen: Soft, nontender, no hepatosplenomegaly  Extremities: No clubbing, cyanosis, or edema. Pedal pulses are normal bilaterally  Neuro: Alert and oriented  Psych: Normal mood                Diagnoses and all orders for this visit:    1. Essential hypertension  Comments:  improved on my check, continue medication. Pertinent labs prior to next visit  Orders:  -     METABOLIC PANEL, COMPREHENSIVE; Future  -     LIPID PANEL; Future    2. Pure hypercholesterolemia  Comments:  fasitng panel shortly before next visit  Orders:  -     METABOLIC PANEL, COMPREHENSIVE; Future  -     LIPID PANEL; Future         Follow-up and Dispositions    · Return in about 6 months (around 11/11/2021) for 30 minute [facility].         Total time spent on encounter, and minutes:    Review of old records plus precharting12  Review of updated medical, family, and social history with patient7  Review of systems4  Exam5  Ordering of labs1  Counseling2    Total time spent on encounter 31 minutes          Wilfrid Smalls MD

## 2021-07-01 ENCOUNTER — HOSPITAL ENCOUNTER (OUTPATIENT)
Dept: LAB | Age: 76
Discharge: HOME | End: 2021-07-01
Payer: MEDICARE

## 2021-07-01 ENCOUNTER — OFFICE VISIT (OUTPATIENT)
Dept: INTERNAL MEDICINE CLINIC | Age: 76
End: 2021-07-01
Payer: MEDICARE

## 2021-07-01 VITALS
RESPIRATION RATE: 12 BRPM | SYSTOLIC BLOOD PRESSURE: 140 MMHG | HEART RATE: 73 BPM | OXYGEN SATURATION: 99 % | BODY MASS INDEX: 29.71 KG/M2 | HEIGHT: 71 IN | TEMPERATURE: 98.1 F | DIASTOLIC BLOOD PRESSURE: 77 MMHG | WEIGHT: 212.2 LBS

## 2021-07-01 DIAGNOSIS — R26.89 BALANCE PROBLEM: ICD-10-CM

## 2021-07-01 DIAGNOSIS — N20.0 RIGHT NEPHROLITHIASIS: ICD-10-CM

## 2021-07-01 DIAGNOSIS — R10.9 FLANK PAIN: ICD-10-CM

## 2021-07-01 DIAGNOSIS — R10.9 FLANK PAIN: Primary | ICD-10-CM

## 2021-07-01 DIAGNOSIS — R31.29 MICROSCOPIC HEMATURIA: ICD-10-CM

## 2021-07-01 LAB
BILIRUB UR QL STRIP: NORMAL
GLUCOSE UR-MCNC: NEGATIVE MG/DL
KETONES P FAST UR STRIP-MCNC: NORMAL MG/DL
PH UR STRIP: 5.5 [PH] (ref 4.6–8)
PROT UR QL STRIP: NEGATIVE
SP GR UR STRIP: 1.03 (ref 1–1.03)
UA UROBILINOGEN AMB POC: NORMAL (ref 0.2–1)
URINALYSIS CLARITY POC: CLEAR
URINALYSIS COLOR POC: NORMAL
URINE BLOOD POC: NORMAL
URINE LEUKOCYTES POC: NEGATIVE
URINE NITRITES POC: NEGATIVE

## 2021-07-01 PROCEDURE — 81002 URINALYSIS NONAUTO W/O SCOPE: CPT | Performed by: INTERNAL MEDICINE

## 2021-07-01 PROCEDURE — 99214 OFFICE O/P EST MOD 30 MIN: CPT | Performed by: INTERNAL MEDICINE

## 2021-07-01 PROCEDURE — G0463 HOSPITAL OUTPT CLINIC VISIT: HCPCS | Performed by: INTERNAL MEDICINE

## 2021-07-01 PROCEDURE — 87086 URINE CULTURE/COLONY COUNT: CPT

## 2021-07-01 RX ORDER — METHOCARBAMOL 500 MG/1
TABLET, FILM COATED ORAL
Qty: 30 TABLET | Refills: 1 | Status: SHIPPED | OUTPATIENT
Start: 2021-07-01 | End: 2022-04-27 | Stop reason: ALTCHOICE

## 2021-07-02 LAB
BACTERIA SPEC CULT: NORMAL
SERVICE CMNT-IMP: NORMAL

## 2021-07-06 ENCOUNTER — TELEPHONE (OUTPATIENT)
Dept: INTERNAL MEDICINE CLINIC | Age: 76
End: 2021-07-06

## 2021-07-08 DIAGNOSIS — R10.9 FLANK PAIN: ICD-10-CM

## 2021-07-08 DIAGNOSIS — N20.0 RIGHT NEPHROLITHIASIS: ICD-10-CM

## 2021-07-08 DIAGNOSIS — R31.29 MICROSCOPIC HEMATURIA: ICD-10-CM

## 2021-07-09 ENCOUNTER — HOSPITAL ENCOUNTER (OUTPATIENT)
Dept: CT IMAGING | Age: 76
Discharge: HOME | End: 2021-07-09
Attending: INTERNAL MEDICINE
Payer: MEDICARE

## 2021-07-09 DIAGNOSIS — R31.29 MICROSCOPIC HEMATURIA: ICD-10-CM

## 2021-07-09 DIAGNOSIS — R10.9 FLANK PAIN: ICD-10-CM

## 2021-07-09 DIAGNOSIS — N20.0 RIGHT NEPHROLITHIASIS: ICD-10-CM

## 2021-07-09 PROCEDURE — 74176 CT ABD & PELVIS W/O CONTRAST: CPT

## 2021-07-13 ENCOUNTER — TELEPHONE (OUTPATIENT)
Dept: INTERNAL MEDICINE CLINIC | Age: 76
End: 2021-07-13

## 2021-07-13 NOTE — TELEPHONE ENCOUNTER
CT scan show small stone in R kidney, not causing his symptoms. Has known cysts in kidneys, stable, a small hiatal hernia, and arthritis in his low back probably the cause of his symptoms.   Let me know  if still having the pain

## 2021-07-14 NOTE — TELEPHONE ENCOUNTER
Would he like to be referred for formal physical therapy for his back pain? Where would he prefer to go?

## 2021-07-14 NOTE — TELEPHONE ENCOUNTER
Pt aware of message below and verbalized understanding. Patient states he is still having the pain and it is about the same. He reports taking extra strength tylenol in the morning and evening to manage the pain and it seems to help.

## 2021-08-02 NOTE — TELEPHONE ENCOUNTER
Patient calling to check on the status of his referral. Patient aware the referral has been sent to neurology, and wants to wait for them to call and schedule an appt.

## 2021-08-02 NOTE — TELEPHONE ENCOUNTER
Pt returning call to nurse. He didn't want to answer Drs question. Says he needs to speak to nurse.  [phone]

## 2021-08-06 ENCOUNTER — TELEPHONE (OUTPATIENT)
Dept: INTERNAL MEDICINE CLINIC | Age: 76
End: 2021-08-06

## 2021-08-06 NOTE — TELEPHONE ENCOUNTER
Pt called asking to speak with Joceline Arnold  I advised him she was out of office he asked for her to call him when she returns

## 2021-08-09 NOTE — TELEPHONE ENCOUNTER
Patient states he was referred to [address] [place] [address] and can not be seen until the middle of October and he does not want to wait that long.  He is going to check a few other neurologist and see where he can get a sooner appointment and then call us back with the name so we can forward the referral.

## 2021-08-30 RX ORDER — ROSUVASTATIN CALCIUM 10 MG/1
10 TABLET, COATED ORAL DAILY
Qty: 90 TABLET | Refills: 3 | Status: SHIPPED | OUTPATIENT
Start: 2021-08-30 | End: 2022-03-03 | Stop reason: SDUPTHER

## 2021-08-30 NOTE — TELEPHONE ENCOUNTER
Last Visit: 7/1/21 with MD Carey Velazquez  Next Appointment: none    Requested Prescriptions     Pending Prescriptions Disp Refills    rosuvastatin (Crestor) 10 mg tablet 90 Tablet 1     Sig: Take 1 Tablet by mouth daily.

## 2021-09-15 ENCOUNTER — TELEPHONE (OUTPATIENT)
Dept: INTERNAL MEDICINE CLINIC | Age: 76
End: 2021-09-15

## 2021-09-15 DIAGNOSIS — H91.90 HEARING LOSS, UNSPECIFIED HEARING LOSS TYPE, UNSPECIFIED LATERALITY: Primary | ICD-10-CM

## 2021-09-15 NOTE — TELEPHONE ENCOUNTER
Pt called because he is having hearing issues and he would like to be referred to a hearing doctor. Preferably in the [address] [place] [address] area. Please advise.  Thank you!!!

## 2022-03-01 RX ORDER — LOSARTAN POTASSIUM 50 MG/1
100 TABLET ORAL DAILY
Qty: 180 TABLET | Refills: 1 | Status: SHIPPED | OUTPATIENT
Start: 2022-03-01 | End: 2022-08-17 | Stop reason: SDUPTHER

## 2022-03-01 NOTE — TELEPHONE ENCOUNTER
Last Visit: 7/1/21 with MD Warren Padron  Next Appointment: 9/15/22 with MD Warren Padron    Requested Prescriptions     Pending Prescriptions Disp Refills    losartan (COZAAR) 50 mg tablet 180 Tablet 1     Sig: Take 2 Tablets by mouth daily.

## 2022-03-03 RX ORDER — ROSUVASTATIN CALCIUM 10 MG/1
10 TABLET, COATED ORAL DAILY
Qty: 90 TABLET | Refills: 1 | Status: SHIPPED | OUTPATIENT
Start: 2022-03-03 | End: 2022-08-23 | Stop reason: SDUPTHER

## 2022-03-03 NOTE — TELEPHONE ENCOUNTER
Previous Rx was sent to [facility]    Last Visit: 7/1/21 with MD Mariya Chacon  Next Appointment: 3/1/22 with MD Mariya Chacon    Requested Prescriptions     Pending Prescriptions Disp Refills    rosuvastatin (Crestor) 10 mg tablet 90 Tablet 3     Sig: Take 1 Tablet by mouth daily.

## 2022-03-18 PROBLEM — M79.2 NEURITIS: Status: ACTIVE | Noted: 2017-02-14

## 2022-03-19 PROBLEM — R31.9 HEMATURIA: Status: ACTIVE | Noted: 2018-08-27

## 2022-03-19 PROBLEM — E29.1 HYPOGONADISM IN MALE: Status: ACTIVE | Noted: 2018-08-27

## 2022-03-19 PROBLEM — N28.1 BILATERAL RENAL CYSTS: Status: ACTIVE | Noted: 2017-02-14

## 2022-03-20 PROBLEM — M48.061 LUMBAR SPINAL STENOSIS: Status: ACTIVE | Noted: 2017-02-14

## 2022-03-20 PROBLEM — M47.816 LUMBAR FACET ARTHROPATHY: Status: ACTIVE | Noted: 2017-02-14

## 2022-04-04 RX ORDER — PANTOPRAZOLE SODIUM 40 MG/1
40 TABLET, DELAYED RELEASE ORAL DAILY
Qty: 90 TABLET | Refills: 1 | Status: SHIPPED | OUTPATIENT
Start: 2022-04-04 | End: 2022-09-22 | Stop reason: SDUPTHER

## 2022-04-04 NOTE — TELEPHONE ENCOUNTER
Last Visit: 7/1/21 with MD Carey Velazquez  Next Appointment: none    Requested Prescriptions     Pending Prescriptions Disp Refills    pantoprazole (PROTONIX) 40 mg tablet 90 Tablet 1     Sig: Take 1 Tablet by mouth daily.

## 2022-04-25 NOTE — PATIENT INSTRUCTIONS
Medicare Wellness Visit, Male    The best way to live healthy is to have a lifestyle where you eat a well-balanced diet, exercise regularly, limit alcohol use, and quit all forms of tobacco/nicotine, if applicable. Regular preventive services are another way to keep healthy. Preventive services (vaccines, screening tests, monitoring & exams) can help personalize your care plan, which helps you manage your own care. Screening tests can find health problems at the earliest stages, when they are easiest to treat. [facility] follows the current, evidence-based guidelines published by the [facility] ([facility]) when recommending preventive services for our patients. Because we follow these guidelines, sometimes recommendations change over time as research supports it. (For example, a prostate screening blood test is no longer routinely recommended for men with no symptoms). Of course, you and your doctor may decide to screen more often for some diseases, based on your risk and co-morbidities (chronic disease you are already diagnosed with). Preventive services for you include:  - Medicare offers their members a free annual wellness visit, which is time for you and your primary care provider to discuss and plan for your preventive service needs. Take advantage of this benefit every year!  -All adults over age 72 should receive the recommended pneumonia vaccines. Current USPSTF guidelines recommend a series of two vaccines for the best pneumonia protection.   -All adults should have a flu vaccine yearly and tetanus vaccine every 10 years.  -All adults age 48 and older should receive the shingles vaccines (series of two vaccines).        -All adults age 38-68 who are overweight should have a diabetes screening test once every three years.   -Other screening tests & preventive services for persons with diabetes include: an eye exam to screen for diabetic retinopathy, a kidney function test, a foot exam, and stricter control over your cholesterol.   -Cardiovascular screening for adults with routine risk involves an electrocardiogram (ECG) at intervals determined by the provider.   -Colorectal cancer screening should be done for adults age 54-65 with no increased risk factors for colorectal cancer. There are a number of acceptable methods of screening for this type of cancer. Each test has its own benefits and drawbacks. Discuss with your provider what is most appropriate for you during your annual wellness visit. The different tests include: colonoscopy (considered the best screening method), a fecal occult blood test, a fecal DNA test, and sigmoidoscopy.  -All adults born between [facility] should be screened once for Hepatitis C.  -An Abdominal Aortic Aneurysm (AAA) Screening is recommended for men age 73-68 who has ever smoked in their lifetime.      Here is a list of your current Health Maintenance items (your personalized list of preventive services) with a due date:  Health Maintenance Due   Topic Date Due    Hepatitis C Test  Never done    DTaP/Tdap/Td  (1 - Tdap) Never done    Shingles Vaccine (1 of 2) Never done    Pneumococcal Vaccine (1 - PCV) Never done    Annual Well Visit  Never done    Cholesterol Test   02/13/2021    COVID-19 Vaccine (2 - Pfizer 3-dose series) 02/24/2021

## 2022-04-25 NOTE — ACP (ADVANCE CARE PLANNING)
Advance Care Planning     Advance Care Planning (ACP) Physician/NP/PA Conversation      Date of Conversation: 4/26/2022  Conducted with: Patient with Decision Making Capacity    Healthcare Decision Maker:   No healthcare decision makers have been documented. Click here to complete [place] including selection of the Healthcare Decision Maker Relationship (ie \"Primary\")          Care Preferences:    Hospitalization: \"If your health worsens and it becomes clear that your chance of recovery is unlikely, what would be your preference regarding hospitalization? \"  The patient would prefer comfort-focused treatment without hospitalization. Ventilation: \"If you were unable to breathe on your own and your chance of recovery was unlikely, what would be your preference about the use of a ventilator (breathing machine) if it was available to you? \"   The patient would desire the use of a ventilator. Resuscitation: \"In the event your heart stopped as a result of an underlying serious health condition, would you want attempts to be made to restart your heart, or would you prefer a natural death? \"   Yes, attempt to resuscitate.     Additional topics discussed: artificial nutrition    Conversation Outcomes / Follow-Up Plan:   ACP complete - no further action today  Reviewed DNR/DNI and patient elects Full Code (Attempt Resuscitation)     Length of Voluntary ACP Conversation in minutes:  16 minutes    Tangela Fajardo MD

## 2022-04-25 NOTE — PROGRESS NOTES
HPI     Colby Dominguez is here for routine Medicare wellness/advanced care planning visit. He and his wife are now living at [facility], and he brings forms for me to fill out for both of them. He is still having trouble with his balance, and is now using a cane to prevent falls. He saw neurology, who checked his B12 and methylmalonic acid, and told him to start B12 over-the-counter. He reports the MRIs of his brain and of his cervical spine were normal.  He is never done formal physical therapy for gait training and balance, and will try to find a place near his new home to possibly do that there. His left knee that he previously injured, and on which he had a procedure, has not been giving him any problems at all. Past Medical History:   Diagnosis Date    GERD (gastroesophageal reflux disease)     HLD (hyperlipidemia)     Hypertension     Tubular adenoma of colon         Past Surgical History:   Procedure Laterality Date    HX KNEE REPLACEMENT Left     HX LAP CHOLECYSTECTOMY      NEUROLOGICAL PROCEDURE UNLISTED      cyst removed from lower back above tail bone        Current Outpatient Medications   Medication Sig Dispense Refill    cholecalciferol (VITAMIN D3) (2,000 UNITS /50 MCG) cap capsule Take  by mouth daily.  cyanocobalamin 1,000 mcg tablet Take 2,500 mcg by mouth daily.  pantoprazole (PROTONIX) 40 mg tablet Take 1 Tablet by mouth daily. 90 Tablet 1    rosuvastatin (Crestor) 10 mg tablet Take 1 Tablet by mouth daily. 90 Tablet 1    losartan (COZAAR) 50 mg tablet Take 2 Tablets by mouth daily.  180 Tablet 1        No Known Allergies     Social History     Socioeconomic History    Marital status:      Spouse name: Not on file    Number of children: Not on file    Years of education: Not on file    Highest education level: Not on file   Occupational History    Not on file   Tobacco Use    Smoking status: Former Smoker    Smokeless tobacco: Former User   Substance and Sexual Activity    Alcohol use: Yes     Comment: occ    Drug use: No    Sexual activity: Not on file   Other Topics Concern    Not on file   Social History Narrative    Not on file     Social Determinants of Health     Financial Resource Strain:     Difficulty of Paying Living Expenses: Not on file   Food Insecurity:     Worried About Running Out of Food in the Last Year: Not on file    Sunny of Food in the Last Year: Not on file   Transportation Needs:     Lack of Transportation (Medical): Not on file    Lack of Transportation (Non-Medical):  Not on file   Physical Activity:     Days of Exercise per Week: Not on file    Minutes of Exercise per Session: Not on file   Stress:     Feeling of Stress : Not on file   Social Connections:     Frequency of Communication with Friends and Family: Not on file    Frequency of Social Gatherings with Friends and Family: Not on file    Attends [employer] Services: Not on file    Active Member of [address] [place] [address] or Organizations: Not on file    Attends Club or Organization Meetings: Not on file    Marital Status: Not on file   Intimate Partner Violence:     Fear of Current or Ex-Partner: Not on file    Emotionally Abused: Not on file    Physically Abused: Not on file    Sexually Abused: Not on file   Housing Stability:     Unable to Pay for Housing in the Last Year: Not on file    Number of [place] in the Last Year: Not on file    Unstable Housing in the Last Year: Not on file        Family History   Problem Relation Age of Onset    Alzheimer's Disease Mother          at age 80    Lung Disease Father         , smoker--  at 72    Other Sister         Stephanie Suring    Thyroid Cancer Daughter     Other Son         low testosterone           Visit Vitals  /70   Pulse 71   Temp 97.9 °F (36.6 °C) (Temporal)   Resp 18   Ht 5' 11\" (1.803 m)   Wt 215 lb (97.5 kg)   SpO2 97%   BMI 29.99 kg/m²        Review of Systems   Eyes: Negative for blurred vision. Respiratory: Negative for shortness of breath. Cardiovascular: Negative for chest pain and orthopnea. Gastrointestinal: Negative for blood in stool. Genitourinary: Negative for hematuria. Neurological: Negative for tingling and headaches. Psychiatric/Behavioral: Negative for depression. The patient is not nervous/anxious. Physical Exam  Constitutional:       General: He is not in acute distress. Comments: Has a cane with him   HENT:      Right Ear: Tympanic membrane, ear canal and external ear normal.      Left Ear: Tympanic membrane, ear canal and external ear normal.   Eyes:      General: No scleral icterus. Conjunctiva/sclera: Conjunctivae normal.   Neck:      Comments: Bilateral carotid upstrokes normal to palpation. Lymph: No cervical, supraclavicular, or axillary lymphadenopathy. Cardiovascular:      Rate and Rhythm: Normal rate and regular rhythm. Pulses: Normal pulses. Heart sounds: No friction rub. No gallop. Pulmonary:      Effort: Pulmonary effort is normal.      Breath sounds: Normal breath sounds. Abdominal:      Palpations: Abdomen is soft. Tenderness: There is no abdominal tenderness. Musculoskeletal:      Cervical back: Neck supple. Comments: No clubbing, no cyanosis, calves are nontender, no cords. There is mild bilateral ankle edema, pitting, mild varicosities of both legs   Skin:     General: Skin is warm and dry. Neurological:      Mental Status: He is alert and oriented to person, place, and time. Psychiatric:         Mood and Affect: Mood normal.                  Diagnoses and all orders for this visit:    1. Medicare annual wellness visit, subsequent    2. Advanced directives, counseling/discussion  -     ADVANCE CARE PLANNING FIRST 30 MINS    3. Essential hypertension  Comments:  Controlled, continue medication. Pertinent labs shortly before next visit.   Orders:  -     LIPID PANEL; Future  -     URINALYSIS W/MICROSCOPIC; Future  -     METABOLIC PANEL, COMPREHENSIVE; Future    4. Encounter for completion of form with patient  Comments:  Brings forms for him and for his wife for assisted living. 5. Balance problem  Comments:  Ports MRI of brain and cervical spine normal per neurology. Recommend consider physical therapy for balance and gait training. Follow-up and Dispositions    · Return in about 6 months (around 10/26/2022) for bp-- labs 1 or 2 weeks before. Denis Gerard MD   This is the Subsequent Medicare Annual Wellness Exam, performed 12 months or more after the Initial AWV or the last Subsequent AWV    I have reviewed the patient's medical history in detail and updated the computerized patient record. Assessment/Plan   Education and counseling provided:  Are appropriate based on today's review and evaluation    1. Advanced directives, counseling/discussion  2. Medicare annual wellness visit, subsequent       Depression Risk Factor Screening     3 most recent PHQ Screens 5/11/2021   Little interest or pleasure in doing things Not at all   Feeling down, depressed, irritable, or hopeless Not at all   Total Score PHQ 2 0       Alcohol & Drug Abuse Risk Screen    Do you average more than 1 drink per night or more than 7 drinks a week: Yes    In the past three months have you have had more than 4 drinks containing alcohol on one occasion: Yes          Functional Ability and Level of Safety    Hearing: The patient needs further evaluation. Activities of Daily Living: The home contains: grab bars  Patient does total self care      Ambulation: with mild difficulty     Fall Risk:  Fall Risk Assessment, last 12 mths 5/11/2021   Able to walk? Yes   Fall in past 12 months? 0   Do you feel unsteady?  0   Are you worried about falling 0      Abuse Screen:  Patient is not abused       Cognitive Screening    Has your family/caregiver stated any concerns about your memory: no     Cognitive Screening: Normal - Mini Cog Test    Health Maintenance Due     Health Maintenance Due   Topic Date Due    Hepatitis C Screening  Never done    DTaP/Tdap/Td series (1 - Tdap) Never done    Shingrix Vaccine Age 50> (1 of 2) Never done    Pneumococcal 65+ years (1 - PCV) Never done    Medicare Yearly Exam  Never done    Lipid Screen  2021    COVID-19 Vaccine (2 - Pfizer 3-dose series) 2021       Patient Care Team   Patient Care Team:  Yas Faith MD as PCP - General (Internal Medicine)  Yas Faith MD as PCP - [facility] Empaneled Provider    History     Patient Active Problem List   Diagnosis Code    Lumbar facet arthropathy M47.816    Neuritis M79.2    Lumbar spinal stenosis M48.061    Bilateral renal cysts N28.1    Hypogonadism in male E29.1    Hematuria R31.9    HLD (hyperlipidemia) E78.5    Hypertension I10    Tubular adenoma of colon D12.6    GERD (gastroesophageal reflux disease) K21.9     Past Medical History:   Diagnosis Date    GERD (gastroesophageal reflux disease)     HLD (hyperlipidemia)     Hypertension     Tubular adenoma of colon       Past Surgical History:   Procedure Laterality Date    HX KNEE REPLACEMENT Left     HX LAP CHOLECYSTECTOMY      NEUROLOGICAL PROCEDURE UNLISTED      cyst removed from lower back above tail bone     Current Outpatient Medications   Medication Sig Dispense Refill    pantoprazole (PROTONIX) 40 mg tablet Take 1 Tablet by mouth daily. 90 Tablet 1    rosuvastatin (Crestor) 10 mg tablet Take 1 Tablet by mouth daily. 90 Tablet 1    losartan (COZAAR) 50 mg tablet Take 2 Tablets by mouth daily.  180 Tablet 1    methocarbamoL (ROBAXIN) 500 mg tablet 1 or 2 po q 6 hrs PRN muscle pain/ spasm 30 Tablet 1     No Known Allergies    Family History   Problem Relation Age of Onset    Alzheimer's Disease Mother          at age 80    Lung Disease Father         , smoker--  at 72    Other Sister Jose Muhammad    Thyroid Cancer Daughter     Other Son         low testosterone     Social History     Tobacco Use    Smoking status: Former Smoker    Smokeless tobacco: Former User   Substance Use Topics    Alcohol use: Yes     Comment: Juliano Adams MD

## 2022-04-26 ENCOUNTER — OFFICE VISIT (OUTPATIENT)
Dept: INTERNAL MEDICINE CLINIC | Age: 77
End: 2022-04-26
Payer: MEDICARE

## 2022-04-26 VITALS
WEIGHT: 215 LBS | HEIGHT: 71 IN | RESPIRATION RATE: 18 BRPM | TEMPERATURE: 97.9 F | HEART RATE: 71 BPM | BODY MASS INDEX: 30.1 KG/M2 | OXYGEN SATURATION: 97 % | SYSTOLIC BLOOD PRESSURE: 136 MMHG | DIASTOLIC BLOOD PRESSURE: 70 MMHG

## 2022-04-26 DIAGNOSIS — Z02.89 ENCOUNTER FOR COMPLETION OF FORM WITH PATIENT: ICD-10-CM

## 2022-04-26 DIAGNOSIS — Z00.00 MEDICARE ANNUAL WELLNESS VISIT, SUBSEQUENT: Primary | ICD-10-CM

## 2022-04-26 DIAGNOSIS — R26.89 BALANCE PROBLEM: ICD-10-CM

## 2022-04-26 DIAGNOSIS — Z71.89 ADVANCED DIRECTIVES, COUNSELING/DISCUSSION: ICD-10-CM

## 2022-04-26 DIAGNOSIS — I10 ESSENTIAL HYPERTENSION: ICD-10-CM

## 2022-04-26 PROCEDURE — G8510 SCR DEP NEG, NO PLAN REQD: HCPCS | Performed by: INTERNAL MEDICINE

## 2022-04-26 PROCEDURE — G8536 NO DOC ELDER MAL SCRN: HCPCS | Performed by: INTERNAL MEDICINE

## 2022-04-26 PROCEDURE — G8752 SYS BP LESS 140: HCPCS | Performed by: INTERNAL MEDICINE

## 2022-04-26 PROCEDURE — 99497 ADVNCD CARE PLAN 30 MIN: CPT | Performed by: INTERNAL MEDICINE

## 2022-04-26 PROCEDURE — G8419 CALC BMI OUT NRM PARAM NOF/U: HCPCS | Performed by: INTERNAL MEDICINE

## 2022-04-26 PROCEDURE — 1101F PT FALLS ASSESS-DOCD LE1/YR: CPT | Performed by: INTERNAL MEDICINE

## 2022-04-26 PROCEDURE — G8754 DIAS BP LESS 90: HCPCS | Performed by: INTERNAL MEDICINE

## 2022-04-26 PROCEDURE — G0463 HOSPITAL OUTPT CLINIC VISIT: HCPCS | Performed by: INTERNAL MEDICINE

## 2022-04-26 PROCEDURE — G8427 DOCREV CUR MEDS BY ELIG CLIN: HCPCS | Performed by: INTERNAL MEDICINE

## 2022-04-26 PROCEDURE — G0439 PPPS, SUBSEQ VISIT: HCPCS | Performed by: INTERNAL MEDICINE

## 2022-04-26 PROCEDURE — 99214 OFFICE O/P EST MOD 30 MIN: CPT | Performed by: INTERNAL MEDICINE

## 2022-04-26 RX ORDER — ACETAMINOPHEN 500 MG
TABLET ORAL DAILY
COMMUNITY

## 2022-04-26 RX ORDER — LANOLIN ALCOHOL/MO/W.PET/CERES
2500 CREAM (GRAM) TOPICAL DAILY
COMMUNITY

## 2022-04-26 NOTE — PROGRESS NOTES
Mac Moss presents today for   Chief Complaint   Patient presents with    Annual Wellness Visit       Is someone accompanying this pt? no    Is the patient using any DME equipment during OV? Yes, cane    Depression Screening:  3 most recent PHQ Screens 4/26/2022   Little interest or pleasure in doing things Not at all   Feeling down, depressed, irritable, or hopeless Not at all   Total Score PHQ 2 0       Learning Assessment:  No flowsheet data found. Abuse Screening:  Abuse Screening Questionnaire 5/11/2021   Do you ever feel afraid of your partner? N   Are you in a relationship with someone who physically or mentally threatens you? N   Is it safe for you to go home? Y       Fall Risk  Fall Risk Assessment, last 12 mths 4/26/2022   Able to walk? Yes   Fall in past 12 months? 0   Do you feel unsteady? 0   Are you worried about falling 0       ADL  ADL Assessment 5/11/2021   Feeding yourself No Help Needed   Getting from bed to chair No Help Needed   Getting dressed No Help Needed   Bathing or showering No Help Needed   Walk across the room (includes cane/walker) No Help Needed   Using the telphone No Help Needed   Taking your medications No Help Needed   Preparing meals No Help Needed   Managing money (expenses/bills) No Help Needed   Moderately strenuous housework (laundry) No Help Needed   Shopping for personal items (toiletries/medicines) No Help Needed   Shopping for groceries No Help Needed   Driving No Help Needed   Climbing a flight of stairs No Help Needed   Getting to places beyond walking distances No Help Needed       Health Maintenance reviewed and discussed and ordered per Provider.     Health Maintenance Due   Topic Date Due    Hepatitis C Screening  Never done    COVID-19 Vaccine (1) Never done    DTaP/Tdap/Td series (1 - Tdap) Never done    Shingrix Vaccine Age 50> (1 of 2) Never done    Pneumococcal 65+ years (1 - PCV) Never done    Medicare Yearly Exam  Never done    Lipid Screen 02/13/2021   .  1. \"Have you been to the ER, urgent care clinic since your last visit? Hospitalized since your last visit? \" No    2. \"Have you seen or consulted any other health care providers outside of the [address] [place] [address] since your last visit? \" No     3. For patients aged 39-70: Has the patient had a colonoscopy / FIT/ Cologuard? NA - based on age      If the patient is female:    4. For patients aged 41-77: Has the patient had a mammogram within the past 2 years? NA - based on age or sex      11. For patients aged 21-65: Has the patient had a pap smear?  NA - based on age or sex

## 2022-06-03 ENCOUNTER — TELEPHONE (OUTPATIENT)
Dept: INTERNAL MEDICINE CLINIC | Age: 77
End: 2022-06-03

## 2022-06-07 ENCOUNTER — TELEPHONE (OUTPATIENT)
Dept: INTERNAL MEDICINE CLINIC | Age: 77
End: 2022-06-07

## 2022-06-08 ENCOUNTER — TELEPHONE (OUTPATIENT)
Dept: INTERNAL MEDICINE CLINIC | Age: 77
End: 2022-06-08

## 2022-06-09 ENCOUNTER — OFFICE VISIT (OUTPATIENT)
Dept: INTERNAL MEDICINE CLINIC | Age: 77
End: 2022-06-09
Payer: MEDICARE

## 2022-06-09 VITALS
HEIGHT: 71 IN | TEMPERATURE: 97 F | WEIGHT: 212 LBS | HEART RATE: 63 BPM | OXYGEN SATURATION: 97 % | BODY MASS INDEX: 29.68 KG/M2 | SYSTOLIC BLOOD PRESSURE: 125 MMHG | RESPIRATION RATE: 16 BRPM | DIASTOLIC BLOOD PRESSURE: 77 MMHG

## 2022-06-09 DIAGNOSIS — Z02.89 ENCOUNTER FOR COMPLETION OF FORM WITH PATIENT: ICD-10-CM

## 2022-06-09 DIAGNOSIS — R22.31 MASS OF WRIST, RIGHT: Primary | ICD-10-CM

## 2022-06-09 PROCEDURE — G8536 NO DOC ELDER MAL SCRN: HCPCS | Performed by: INTERNAL MEDICINE

## 2022-06-09 PROCEDURE — G8427 DOCREV CUR MEDS BY ELIG CLIN: HCPCS | Performed by: INTERNAL MEDICINE

## 2022-06-09 PROCEDURE — G0463 HOSPITAL OUTPT CLINIC VISIT: HCPCS | Performed by: INTERNAL MEDICINE

## 2022-06-09 PROCEDURE — G8510 SCR DEP NEG, NO PLAN REQD: HCPCS | Performed by: INTERNAL MEDICINE

## 2022-06-09 PROCEDURE — 1101F PT FALLS ASSESS-DOCD LE1/YR: CPT | Performed by: INTERNAL MEDICINE

## 2022-06-09 PROCEDURE — G8754 DIAS BP LESS 90: HCPCS | Performed by: INTERNAL MEDICINE

## 2022-06-09 PROCEDURE — 99213 OFFICE O/P EST LOW 20 MIN: CPT | Performed by: INTERNAL MEDICINE

## 2022-06-09 PROCEDURE — G8417 CALC BMI ABV UP PARAM F/U: HCPCS | Performed by: INTERNAL MEDICINE

## 2022-06-09 PROCEDURE — 1123F ACP DISCUSS/DSCN MKR DOCD: CPT | Performed by: INTERNAL MEDICINE

## 2022-06-09 PROCEDURE — G8752 SYS BP LESS 140: HCPCS | Performed by: INTERNAL MEDICINE

## 2022-06-09 NOTE — PROGRESS NOTES
HPI     Frankey Petrin is here because he noticed a bony protuberance of his right wrist.  He does not recall any trauma, recent or distant. The area is not painful, and does not seem to be growing in size. He also complains of balance problems, and wonders if I would fill out the [facility] form for him to get a handicap placard so he does not have to walk such long distances. He is using a cane today. His knee has been bothering him again. Past Medical History:   Diagnosis Date    GERD (gastroesophageal reflux disease)     HLD (hyperlipidemia)     Hypertension     Tubular adenoma of colon         Past Surgical History:   Procedure Laterality Date    HX KNEE REPLACEMENT Left     HX LAP CHOLECYSTECTOMY      NEUROLOGICAL PROCEDURE UNLISTED      cyst removed from lower back above tail bone        Current Outpatient Medications   Medication Sig Dispense Refill    cholecalciferol (VITAMIN D3) (2,000 UNITS /50 MCG) cap capsule Take  by mouth daily.  cyanocobalamin 1,000 mcg tablet Take 2,500 mcg by mouth daily.  pantoprazole (PROTONIX) 40 mg tablet Take 1 Tablet by mouth daily. 90 Tablet 1    rosuvastatin (Crestor) 10 mg tablet Take 1 Tablet by mouth daily. 90 Tablet 1    losartan (COZAAR) 50 mg tablet Take 2 Tablets by mouth daily.  180 Tablet 1        No Known Allergies     Social History     Socioeconomic History    Marital status:      Spouse name: Not on file    Number of children: Not on file    Years of education: Not on file    Highest education level: Not on file   Occupational History    Not on file   Tobacco Use    Smoking status: Former Smoker    Smokeless tobacco: Former User   Substance and Sexual Activity    Alcohol use: Yes     Comment: occ    Drug use: No    Sexual activity: Not on file   Other Topics Concern    Not on file   Social History Narrative    Not on file     Social Determinants of Health     Financial Resource Strain:     Difficulty of Paying Living Expenses: Not on file   Food Insecurity:     Worried About Running Out of Food in the Last Year: Not on file    Sunny of Food in the Last Year: Not on file   Transportation Needs:     Lack of Transportation (Medical): Not on file    Lack of Transportation (Non-Medical): Not on file   Physical Activity:     Days of Exercise per Week: Not on file    Minutes of Exercise per Session: Not on file   Stress:     Feeling of Stress : Not on file   Social Connections:     Frequency of Communication with Friends and Family: Not on file    Frequency of Social Gatherings with Friends and Family: Not on file    Attends [employer] Services: Not on file    Active Member of [address] [place] [address] [employer] or Organizations: Not on file    Attends Club or Organization Meetings: Not on file    Marital Status: Not on file   Intimate Partner Violence:     Fear of Current or Ex-Partner: Not on file    Emotionally Abused: Not on file    Physically Abused: Not on file    Sexually Abused: Not on file   Housing Stability:     Unable to Pay for Housing in the Last Year: Not on file    Number of [place] in the Last Year: Not on file    Unstable Housing in the Last Year: Not on file        Family History   Problem Relation Age of Onset    Alzheimer's Disease Mother          at age 80    Lung Disease Father         , smoker--  at 72    Other Sister         Stephanie Bennett    Thyroid Cancer Daughter     Other Son         low testosterone           Visit Vitals  /77   Pulse 63   Temp 97 °F (36.1 °C) (Temporal)   Resp 16   Ht 5' 11\" (1.803 m)   Wt 212 lb (96.2 kg)   SpO2 97%   BMI 29.57 kg/m²        Review of Systems   Cardiovascular: Negative for chest pain and palpitations. Gastrointestinal: Negative for blood in stool. Genitourinary: Negative for hematuria. Psychiatric/Behavioral: Negative for depression and memory loss. Physical Exam  Constitutional:       General: He is not in acute distress. Appearance: He is not ill-appearing. Eyes:      Conjunctiva/sclera: Conjunctivae normal.   Neck:      Comments: Bilateral carotid upstrokes normal to palpation. Cardiovascular:      Rate and Rhythm: Normal rate and regular rhythm. Heart sounds: No friction rub. No gallop. Pulmonary:      Effort: Pulmonary effort is normal.      Breath sounds: Normal breath sounds. Musculoskeletal:      Cervical back: Neck supple. Comments: N no edema on right o clubbing, no cyanosis. Calves are nontender, no cords. There is mild left lower extremity edema with changes of venous stasis    Skin:     General: Skin is warm and dry. Neurological:      Mental Status: He is alert and oriented to person, place, and time. Psychiatric:         Mood and Affect: Mood normal.                  Diagnoses and all orders for this visit:    1. Mass of wrist, right  Comments:  Small, nontender, monitor for growth or development of pain. Texture is cartilaginous. Has order for wrist x-ray if grows or hurts  Orders:  -     XR WRIST RT AP/LAT; Future    2. Encounter for completion of form with patient  Comments:  DMV form completed for handicap placard; patient needs handicap parking to avoid walking long distances with his disequilibrium.                   Joanna Dowell MD

## 2022-06-09 NOTE — PROGRESS NOTES
Oneida Galindo presents today for   Chief Complaint   Patient presents with    Wrist Swelling     x 3 months      1. \"Have you been to the ER, urgent care clinic since your last visit? Hospitalized since your last visit? \" no    2. \"Have you seen or consulted any other health care providers outside of the [address] [place] [address] since your last visit? \" no     3. For patients aged 39-70: Has the patient had a colonoscopy / FIT/ Cologuard? Yes - no Care Gap present      If the patient is female:    4. For patients aged 41-77: Has the patient had a mammogram within the past 2 years? NA - based on age or sex  See top three    5. For patients aged 21-65: Has the patient had a pap smear?  NA - based on age or sex

## 2022-06-27 NOTE — PROGRESS NOTES
HPI     Machelle Smith presents with 11 days of intermittent, severe, bilateral flank pain. He was recently in [place]. He denies becoming dehydrated. Not aware he had a kidney stone in the right kidney. The pain is intermittent, severe, and temporarily relieved with Tylenol, with relief lasting up to 18 hours. He also notes that he has some trouble with balance. When he first stands up, he has to wait to get his bearings. He then walks with a shuffling, wide-based gait, per his wife's notes that he brings to the visit. Devorah Sparks His wife sent with him says he holds onto walls or furniture as he crosses the room. He denies that this has anything to do with knee pain or back pain. Past Medical History:   Diagnosis Date    GERD (gastroesophageal reflux disease)     HLD (hyperlipidemia)     Hypertension     Tubular adenoma of colon         Past Surgical History:   Procedure Laterality Date    HX KNEE REPLACEMENT Left     HX LAP CHOLECYSTECTOMY      NEUROLOGICAL PROCEDURE UNLISTED      cyst removed from lower back above tail bone        Current Outpatient Medications   Medication Sig Dispense Refill    methocarbamoL (ROBAXIN) 500 mg tablet 1 or 2 po q 6 hrs PRN muscle pain/ spasm 30 Tablet 1    pantoprazole (PROTONIX) 40 mg tablet       losartan (COZAAR) 100 mg tablet       CRESTOR 10 mg tablet Take 10 mg by mouth daily.           No Known Allergies     Social History     Socioeconomic History    Marital status:      Spouse name: Not on file    Number of children: Not on file    Years of education: Not on file    Highest education level: Not on file   Occupational History    Not on file   Tobacco Use    Smoking status: Former Smoker    Smokeless tobacco: Former User   Substance and Sexual Activity    Alcohol use: Yes     Comment: occ    Drug use: No    Sexual activity: Not on file   Other Topics Concern    Not on file   Social History Narrative    Not on file     Social Determinants of Health Financial Resource Strain:     Difficulty of Paying Living Expenses:    Food Insecurity:     Worried About Running Out of Food in the Last Year:     [address] in the Last Year:    Transportation Needs:     Lack of Transportation (Medical):  Lack of Transportation (Non-Medical):    Physical Activity:     Days of Exercise per Week:     Minutes of Exercise per Session:    Stress:     Feeling of Stress :    Social Connections:     Frequency of Communication with Friends and Family:     Frequency of Social Gatherings with Friends and Family:     Attends [employer] Services:     Active Member of Clubs or Organizations:     Attends Club or Organization Meetings:     Marital Status:    Intimate Partner Violence:     Fear of Current or Ex-Partner:     Emotionally Abused:     Physically Abused:     Sexually Abused:         Family History   Problem Relation Age of Onset    Alzheimer Mother          at age 80    Lung Disease Father         , smoker--  at 72    Other Sister         Nayla Hoff    Thyroid Cancer Daughter     Other Son         low testosterone           Visit Vitals  BP (!) 140/77 (BP 1 Location: Right upper arm, BP Patient Position: Sitting)   Pulse 73   Temp 98.1 °F (36.7 °C)   Resp 12   Ht 5' 11\" (1.803 m)   Wt 212 lb 3.2 oz (96.3 kg)   SpO2 99%   BMI 29.60 kg/m²        Review of Systems   Constitutional: Negative for chills and fever. Genitourinary: Positive for flank pain. Negative for dysuria, frequency and urgency. No gross hematuria       Physical Exam  Vitals reviewed. Constitutional:       General: He is not in acute distress. Appearance: He is not ill-appearing. Eyes:      General: No scleral icterus. Conjunctiva/sclera: Conjunctivae normal.   Neck:      Comments: Lymph: no cervical or supraclavicular LAD  Cardiovascular:      Rate and Rhythm: Normal rate and regular rhythm. Heart sounds: Murmur heard. No friction rub. Chest pain No gallop. Pulmonary:      Effort: Pulmonary effort is normal.      Breath sounds: Normal breath sounds. Abdominal:      Palpations: There is no mass. Tenderness: There is no right CVA tenderness or left CVA tenderness. Musculoskeletal:      Cervical back: Neck supple. Comments: No clubbing, cyanosis, or edema. No apin with twisting torso side- to side, since recently took Tylenol   Skin:     General: Skin is warm and dry. Neurological:      Mental Status: He is alert and oriented to person, place, and time. Comments: CN II-XI intact, no dysmetria, no pronator drift. Wide- based gait. Negative SLRs B. No cogwheel rigidity.  (+) mild \"en-bloc\" turning. Psychiatric:         Mood and Affect: Mood normal.                  Diagnoses and all orders for this visit:    1. Flank pain  Comments:  with microscopic hematuria, check helical cT abd/pelvis, rule out stone. Check culture, rule out UTI. In case MSK, rx Robaxan sent, heat, Tylenol 2 tid  Orders:  -     AMB POC URINALYSIS DIP STICK MANUAL W/O MICRO  -     CULTURE, URINE; Future  -     methocarbamoL (ROBAXIN) 500 mg tablet; 1 or 2 po q 6 hrs PRN muscle pain/ spasm  -     CT ABD WO CONT; Future  -     CT PELV WO CONT; Future    2. Microscopic hematuria  Comments:  check culture, rule out UTI. Helical CTs, rule out nephrolithiasis  Orders:  -     CT ABD WO CONT; Future  -     CT PELV WO CONT; Future    3. Right nephrolithiasis  Comments:  intrarenal on past CT, may have moved  Orders:  -     CT ABD WO CONT; Future  -     CT PELV WO CONT; Future    4.  Balance problem  Comments:  referral neurology                  Jaylen Kerr MD

## 2022-07-09 DIAGNOSIS — R22.31 MASS OF WRIST, RIGHT: ICD-10-CM

## 2022-07-25 ENCOUNTER — OFFICE VISIT (OUTPATIENT)
Dept: INTERNAL MEDICINE CLINIC | Age: 77
End: 2022-07-25
Payer: MEDICARE

## 2022-07-25 ENCOUNTER — HOSPITAL ENCOUNTER (OUTPATIENT)
Dept: GENERAL RADIOLOGY | Age: 77
Discharge: HOME | End: 2022-07-25
Payer: MEDICARE

## 2022-07-25 ENCOUNTER — HOSPITAL ENCOUNTER (OUTPATIENT)
Dept: LAB | Age: 77
Discharge: HOME | End: 2022-07-25
Payer: MEDICARE

## 2022-07-25 VITALS
RESPIRATION RATE: 18 BRPM | TEMPERATURE: 98.2 F | HEIGHT: 71 IN | HEART RATE: 82 BPM | WEIGHT: 213 LBS | BODY MASS INDEX: 29.82 KG/M2 | SYSTOLIC BLOOD PRESSURE: 125 MMHG | DIASTOLIC BLOOD PRESSURE: 83 MMHG | OXYGEN SATURATION: 98 %

## 2022-07-25 DIAGNOSIS — R05.9 COUGH: ICD-10-CM

## 2022-07-25 DIAGNOSIS — M54.6 ACUTE MIDLINE THORACIC BACK PAIN: ICD-10-CM

## 2022-07-25 DIAGNOSIS — R31.29 MICROSCOPIC HEMATURIA: ICD-10-CM

## 2022-07-25 DIAGNOSIS — R05.9 COUGH: Primary | ICD-10-CM

## 2022-07-25 PROBLEM — R05.8 DRY COUGH: Status: ACTIVE | Noted: 2022-07-25

## 2022-07-25 LAB
BILIRUB UR QL STRIP: NORMAL
GLUCOSE UR-MCNC: NEGATIVE MG/DL
KETONES P FAST UR STRIP-MCNC: NORMAL MG/DL
PH UR STRIP: 5.5 [PH] (ref 4.6–8)
PROT UR QL STRIP: NORMAL
SP GR UR STRIP: 1.02 (ref 1–1.03)
UA UROBILINOGEN AMB POC: NORMAL (ref 0.2–1)
URINALYSIS CLARITY POC: CLEAR
URINALYSIS COLOR POC: NORMAL
URINE BLOOD POC: NORMAL
URINE LEUKOCYTES POC: NEGATIVE
URINE NITRITES POC: NEGATIVE

## 2022-07-25 PROCEDURE — G8754 DIAS BP LESS 90: HCPCS | Performed by: INTERNAL MEDICINE

## 2022-07-25 PROCEDURE — 99214 OFFICE O/P EST MOD 30 MIN: CPT | Performed by: INTERNAL MEDICINE

## 2022-07-25 PROCEDURE — G8417 CALC BMI ABV UP PARAM F/U: HCPCS | Performed by: INTERNAL MEDICINE

## 2022-07-25 PROCEDURE — G8427 DOCREV CUR MEDS BY ELIG CLIN: HCPCS | Performed by: INTERNAL MEDICINE

## 2022-07-25 PROCEDURE — G0463 HOSPITAL OUTPT CLINIC VISIT: HCPCS | Performed by: INTERNAL MEDICINE

## 2022-07-25 PROCEDURE — 72070 X-RAY EXAM THORAC SPINE 2VWS: CPT

## 2022-07-25 PROCEDURE — 73100 X-RAY EXAM OF WRIST: CPT

## 2022-07-25 PROCEDURE — 1123F ACP DISCUSS/DSCN MKR DOCD: CPT | Performed by: INTERNAL MEDICINE

## 2022-07-25 PROCEDURE — 81003 URINALYSIS AUTO W/O SCOPE: CPT | Performed by: INTERNAL MEDICINE

## 2022-07-25 PROCEDURE — 1101F PT FALLS ASSESS-DOCD LE1/YR: CPT | Performed by: INTERNAL MEDICINE

## 2022-07-25 PROCEDURE — G8510 SCR DEP NEG, NO PLAN REQD: HCPCS | Performed by: INTERNAL MEDICINE

## 2022-07-25 PROCEDURE — 71046 X-RAY EXAM CHEST 2 VIEWS: CPT

## 2022-07-25 PROCEDURE — G8752 SYS BP LESS 140: HCPCS | Performed by: INTERNAL MEDICINE

## 2022-07-25 PROCEDURE — G8536 NO DOC ELDER MAL SCRN: HCPCS | Performed by: INTERNAL MEDICINE

## 2022-07-25 PROCEDURE — 87086 URINE CULTURE/COLONY COUNT: CPT

## 2022-07-25 RX ORDER — IBUPROFEN 200 MG
TABLET ORAL
COMMUNITY

## 2022-07-25 RX ORDER — DEXTROMETHORPHAN HYDROBROMIDE, GUAIFENESIN 5; 100 MG/5ML; MG/5ML
650 LIQUID ORAL EVERY 8 HOURS
COMMUNITY

## 2022-07-25 RX ORDER — HYDROCODONE BITARTRATE AND ACETAMINOPHEN 5; 325 MG/1; MG/1
1 TABLET ORAL
Qty: 30 TABLET | Refills: 0 | Status: SHIPPED | OUTPATIENT
Start: 2022-07-25 | End: 2022-08-04

## 2022-07-25 NOTE — PROGRESS NOTES
JEAN Velazquez is seen today with about a week of dry cough, most noticeable when he is lying down, with subjective end expiratory wheezing. He denies any other URI symptoms, and reports he did a COVID test which was negative. He also complains of sudden onset of 8 out of 10 severity thoracic back pain, woke up with it, and it keeps him awake at night. No change with ice and Tylenol. He does not recall any heavy lifting or twisting. He did have an episode of emesis a few days ago, but none since. He does not feel unwell overall. Past Medical History:   Diagnosis Date    GERD (gastroesophageal reflux disease)     HLD (hyperlipidemia)     Hypertension     Tubular adenoma of colon         Past Surgical History:   Procedure Laterality Date    HX KNEE REPLACEMENT Left     HX LAP CHOLECYSTECTOMY      NEUROLOGICAL PROCEDURE UNLISTED      cyst removed from lower back above tail bone        Current Outpatient Medications   Medication Sig Dispense Refill    ibuprofen (AdviL) 200 mg tablet Take  by mouth. acetaminophen (Tylenol Arthritis Pain) 650 mg TbER Take 650 mg by mouth every eight (8) hours. HYDROcodone-acetaminophen (NORCO) 5-325 mg per tablet Take 1 Tablet by mouth every eight (8) hours as needed for Pain for up to 10 days. Max Daily Amount: 3 Tablets. 30 Tablet 0    cholecalciferol (VITAMIN D3) (2,000 UNITS /50 MCG) cap capsule Take  by mouth daily. cyanocobalamin 1,000 mcg tablet Take 2,500 mcg by mouth daily. pantoprazole (PROTONIX) 40 mg tablet Take 1 Tablet by mouth daily. 90 Tablet 1    rosuvastatin (Crestor) 10 mg tablet Take 1 Tablet by mouth daily. 90 Tablet 1    losartan (COZAAR) 50 mg tablet Take 2 Tablets by mouth daily.  180 Tablet 1        No Known Allergies     Social History     Socioeconomic History    Marital status:      Spouse name: Not on file    Number of children: Not on file    Years of education: Not on file    Highest education level: Not on file Occupational History    Not on file   Tobacco Use    Smoking status: Former    Smokeless tobacco: Former   Substance and Sexual Activity    Alcohol use: Yes     Comment: occ    Drug use: No    Sexual activity: Not on file   Other Topics Concern    Not on file   Social History Narrative    Not on file     Social Determinants of Health     Financial Resource Strain: Not on file   Food Insecurity: Not on file   Transportation Needs: Not on file   Physical Activity: Not on file   Stress: Not on file   Social Connections: Not on file   Intimate Partner Violence: Not on file   Housing Stability: Not on file        Family History   Problem Relation Age of Onset    Alzheimer's Disease Mother          at age 80    Lung Disease Father         , smoker--  at 72    Other Sister         Stephanie Bennett    Thyroid Cancer Daughter     Other Son         low testosterone           Visit Vitals  /83   Pulse 82   Temp 98.2 °F (36.8 °C) (Temporal)   Resp 18   Ht 5' 11\" (1.803 m)   Wt 213 lb (96.6 kg)   SpO2 98%   BMI 29.71 kg/m²        Review of Systems   Constitutional:  Negative for chills and fever. No drenching night sweats   HENT:  Negative for congestion and sore throat. No loss of taste, no loss of smell. Respiratory:  Negative for hemoptysis and shortness of breath. Cardiovascular:  Negative for chest pain and orthopnea. Gastrointestinal:  Negative for blood in stool, diarrhea and nausea. Genitourinary:  Negative for dysuria, frequency, hematuria and urgency. Physical Exam  Constitutional:       General: He is not in acute distress. Appearance: He is not ill-appearing. Comments: Stable weight   Eyes:      General: No scleral icterus. Conjunctiva/sclera: Conjunctivae normal.   Neck:      Comments: No cervical or supraclavicular lymphadenopathy. Cardiovascular:      Rate and Rhythm: Normal rate and regular rhythm. Heart sounds: No friction rub. No gallop. Pulmonary:      Effort: Pulmonary effort is normal.      Breath sounds: Normal breath sounds. Abdominal:      Palpations: Abdomen is soft. Tenderness: There is no abdominal tenderness. Musculoskeletal:      Cervical back: Neck supple. Comments: No clubbing, cyanosis, or edema. Calves nontender, no cords. Skin:     General: Skin is warm and dry. Neurological:      Mental Status: He is alert and oriented to person, place, and time. Psychiatric:         Mood and Affect: Mood normal.        Urine: Positive for blood, positive for protein        Diagnoses and all orders for this visit:    1. Cough  Comments:  Reports negative COVID test.  Check chest x-ray. Orders:  -     XR CHEST PA LAT; Future    2. Acute midline thoracic back pain  Comments:  w/out obvious trigger. Vicodin prescribed with precautions, recommend take stool softener with. Order given for thoracic spine x-ray, plans to do today  Orders:  -     XR SPINE THORAC 2 V; Future  -     HYDROcodone-acetaminophen (NORCO) 5-325 mg per tablet; Take 1 Tablet by mouth every eight (8) hours as needed for Pain for up to 10 days. Max Daily Amount: 3 Tablets. -     AMB POC URINALYSIS DIP STICK AUTO W/O MICRO  -     CULTURE, URINE; Future    3. Microscopic hematuria  Comments:  Known nephrolithiasis.   Urine sent for culture, will treat if positive                  Wolf Douglas MD

## 2022-07-25 NOTE — PROGRESS NOTES
Krupa Dudley presents today for   Chief Complaint   Patient presents with    Neck Pain    Back Pain       Is someone accompanying this pt? no    Is the patient using any DME equipment during OV? no    Depression Screening:  3 most recent PHQ Screens 7/25/2022   Little interest or pleasure in doing things Not at all   Feeling down, depressed, irritable, or hopeless Not at all   Total Score PHQ 2 0       Learning Assessment:  No flowsheet data found. Abuse Screening:  Abuse Screening Questionnaire 7/25/2022   Do you ever feel afraid of your partner? N   Are you in a relationship with someone who physically or mentally threatens you? N   Is it safe for you to go home? Y       Fall Risk  Fall Risk Assessment, last 12 mths 7/25/2022   Able to walk? Yes   Fall in past 12 months? 0   Do you feel unsteady? 0   Are you worried about falling 0       ADL  ADL Assessment 5/11/2021   Feeding yourself No Help Needed   Getting from bed to chair No Help Needed   Getting dressed No Help Needed   Bathing or showering No Help Needed   Walk across the room (includes cane/walker) No Help Needed   Using the telphone No Help Needed   Taking your medications No Help Needed   Preparing meals No Help Needed   Managing money (expenses/bills) No Help Needed   Moderately strenuous housework (laundry) No Help Needed   Shopping for personal items (toiletries/medicines) No Help Needed   Shopping for groceries No Help Needed   Driving No Help Needed   Climbing a flight of stairs No Help Needed   Getting to places beyond walking distances No Help Needed       Health Maintenance reviewed and discussed and ordered per Provider. Health Maintenance Due   Topic Date Due    DTaP/Tdap/Td series (1 - Tdap) Never done    Shingrix Vaccine Age 50> (1 of 2) Never done    Pneumococcal 65+ years (1 - PCV) Never done    Lipid Screen  02/13/2021    COVID-19 Vaccine (4 - Booster) 01/26/2022   . Coordination of Care:  1.  Have you been to the ER, urgent care clinic since your last visit? Hospitalized since your last visit? no    2. Have you seen or consulted any other health care providers outside of the [address] [place] [address] since your last visit? Include any pap smears or colon screening. no    3. For patients aged 39-70: Has the patient had a colonoscopy? NA - based on age     If the patient is female:    4. For patients aged 41-77: Has the patient had a mammogram within the past 2 years? NA - based on age    11. For patients aged 21-65: Has the patient had a pap smear?  NA - based on age

## 2022-07-26 ENCOUNTER — TELEPHONE (OUTPATIENT)
Dept: INTERNAL MEDICINE CLINIC | Age: 77
End: 2022-07-26

## 2022-07-26 LAB
BACTERIA SPEC CULT: ABNORMAL
CC UR VC: ABNORMAL
SERVICE CMNT-IMP: ABNORMAL

## 2022-07-26 NOTE — TELEPHONE ENCOUNTER
Let him know the chest x-ray and wrist x-ray were normal, but I do not see results of the thoracic spine x-ray. Please check that that was done.

## 2022-07-26 NOTE — TELEPHONE ENCOUNTER
Let him knowhis spine x-ray shows a possible fracture of one of his lower vertebrae further down in the spine, please send order for lumbosacral spine x-rays diagnosis L1 compression fracture

## 2022-07-26 NOTE — TELEPHONE ENCOUNTER
[facility] lab calling says they need a different dx code for the urine. Medicare will not cover what was given.

## 2022-07-27 ENCOUNTER — TELEPHONE (OUTPATIENT)
Dept: INTERNAL MEDICINE CLINIC | Age: 77
End: 2022-07-27

## 2022-07-27 DIAGNOSIS — M54.50 ACUTE BILATERAL LOW BACK PAIN WITHOUT SCIATICA: ICD-10-CM

## 2022-07-27 DIAGNOSIS — N30.01 ACUTE CYSTITIS WITH HEMATURIA: Primary | ICD-10-CM

## 2022-07-27 DIAGNOSIS — M54.50 ACUTE BILATERAL LOW BACK PAIN WITHOUT SCIATICA: Primary | ICD-10-CM

## 2022-07-27 RX ORDER — CIPROFLOXACIN 250 MG/1
250 TABLET, FILM COATED ORAL EVERY 12 HOURS
Qty: 20 TABLET | Refills: 0 | Status: SHIPPED | OUTPATIENT
Start: 2022-07-27 | End: 2022-08-06

## 2022-07-27 NOTE — TELEPHONE ENCOUNTER
Let him know his urine did grow out some bacteria, I sent an antibiotic to his pharmacy.   Please start today

## 2022-07-28 ENCOUNTER — HOSPITAL ENCOUNTER (OUTPATIENT)
Dept: GENERAL RADIOLOGY | Age: 77
Discharge: HOME | End: 2022-07-28
Payer: MEDICARE

## 2022-07-28 PROCEDURE — 72100 X-RAY EXAM L-S SPINE 2/3 VWS: CPT

## 2022-07-28 NOTE — TELEPHONE ENCOUNTER
Unable to leave message.  See message below:      I have put in the order for lumbar spine xrays, please have him do these

## 2022-07-30 ENCOUNTER — TELEPHONE (OUTPATIENT)
Dept: INTERNAL MEDICINE CLINIC | Age: 77
End: 2022-07-30

## 2022-07-30 DIAGNOSIS — Z87.81 HISTORY OF VERTEBRAL FRACTURE: Primary | ICD-10-CM

## 2022-07-30 NOTE — TELEPHONE ENCOUNTER
Additional x-rays confirm a fracture at the L1 vertebra. The radiologist thinks that a fragment of bone might be pressing against his spinal cord, recommends an MRI, ask him if it is okay for me to set that up for him.

## 2022-08-08 ENCOUNTER — HOSPITAL ENCOUNTER (OUTPATIENT)
Age: 77
Discharge: HOME | End: 2022-08-08
Attending: INTERNAL MEDICINE
Payer: MEDICARE

## 2022-08-08 ENCOUNTER — TELEPHONE (OUTPATIENT)
Dept: INTERNAL MEDICINE CLINIC | Age: 77
End: 2022-08-08

## 2022-08-08 DIAGNOSIS — M54.6 ACUTE MIDLINE THORACIC BACK PAIN: Primary | ICD-10-CM

## 2022-08-08 DIAGNOSIS — Z87.81 HISTORY OF VERTEBRAL FRACTURE: ICD-10-CM

## 2022-08-08 PROCEDURE — 72148 MRI LUMBAR SPINE W/O DYE: CPT

## 2022-08-08 NOTE — TELEPHONE ENCOUNTER
----- Message from [facility] sent at 8/8/2022  3:57 PM EDT -----  Subject: Refill Request    QUESTIONS  Name of Medication? HYDROcodone-acetaminophen (NORCO) 5-325 mg per tablet  Patient-reported dosage and instructions? 5-325mg per Tablet 3 tablets per   day   How many days do you have left? 0  Preferred Pharmacy? [facility]Y #[id number]  Pharmacy phone number (if available)? [phone]  Additional Information for Provider? pt is out of medication.   ---------------------------------------------------------------------------  --------------  CALL BACK INFO  What is the best way for the office to contact you? OK to leave message on   voicemail  Preferred Call Back Phone Number? [phone]  ---------------------------------------------------------------------------  --------------  SCRIPT ANSWERS  Relationship to Patient?  Self

## 2022-08-08 NOTE — TELEPHONE ENCOUNTER
I will call him with results when I get them, can tell him i'm out of the office since I'm having surgery tomorrow

## 2022-08-08 NOTE — TELEPHONE ENCOUNTER
Pt did schedule appt for next available but he says that is not good enough. Wants to know when Dr. Rene Rivas will be able to give him the results since it is about back pain.

## 2022-08-08 NOTE — TELEPHONE ENCOUNTER
----- Message from [facility] sent at 8/8/2022  3:54 PM EDT -----  Subject: Appointment Request    Reason for Call: Established Patient Appointment needed: Routine Existing   Condition Follow Up    QUESTIONS    Reason for appointment request? Available appointments did not meet   patient need     Additional Information for Provider? Pt is having an MRI done on 08/08/22   He is requesting an appointment to go over the reading of that MRI with   PCP Yung Mendez.  He is requesting an appointment sooner than 09/01/22.     ---------------------------------------------------------------------------  --------------  [id number]  [phone]; OK to leave message on voicemail  ---------------------------------------------------------------------------  --------------  SCRIPT ANSWERS  COVID Screen: [employer]

## 2022-08-09 NOTE — TELEPHONE ENCOUNTER
Last Visit: 7/25/22 with MD Ericka Fox  Next Appointment: 9/1/22 with MD Ericka Fox  Previous Refill Encounter(s): 7/25/22 #30    Requested Prescriptions     Pending Prescriptions Disp Refills    HYDROcodone-acetaminophen (NORCO) 5-325 mg per tablet 30 Tablet 0     Sig: Take 1 Tablet by mouth every eight (8) hours as needed for Pain for up to 10 days. Max Daily Amount: 3 Tablets. Indications: pain         For Pharmacy Admin Tracking Only    CPA in place:   Recommendation Provided To:    Intervention Detail: New Rx: 1, reason: Patient Preference  Gap Closed?:   Intervention Accepted By:   Time Spent (min): 5

## 2022-08-11 ENCOUNTER — TELEPHONE (OUTPATIENT)
Dept: INTERNAL MEDICINE CLINIC | Age: 77
End: 2022-08-11

## 2022-08-11 DIAGNOSIS — R31.29 MICROSCOPIC HEMATURIA: Primary | ICD-10-CM

## 2022-08-11 DIAGNOSIS — M54.6 ACUTE MIDLINE THORACIC BACK PAIN: ICD-10-CM

## 2022-08-11 NOTE — TELEPHONE ENCOUNTER
Left message for patient with [facility]) MRI has not resulted and patient can check back tomorrow or Monday.

## 2022-08-15 DIAGNOSIS — G89.29 CHRONIC LOW BACK PAIN WITHOUT SCIATICA, UNSPECIFIED BACK PAIN LATERALITY: Primary | ICD-10-CM

## 2022-08-15 DIAGNOSIS — S32.029A CLOSED FRACTURE OF SECOND LUMBAR VERTEBRA, UNSPECIFIED FRACTURE MORPHOLOGY, INITIAL ENCOUNTER (HCC): ICD-10-CM

## 2022-08-15 DIAGNOSIS — S32.019A CLOSED FRACTURE OF FIRST LUMBAR VERTEBRA, UNSPECIFIED FRACTURE MORPHOLOGY, INITIAL ENCOUNTER (HCC): Primary | ICD-10-CM

## 2022-08-15 DIAGNOSIS — S32.019A CLOSED FRACTURE OF FIRST LUMBAR VERTEBRA, UNSPECIFIED FRACTURE MORPHOLOGY, INITIAL ENCOUNTER (HCC): ICD-10-CM

## 2022-08-15 DIAGNOSIS — M54.50 CHRONIC LOW BACK PAIN WITHOUT SCIATICA, UNSPECIFIED BACK PAIN LATERALITY: Primary | ICD-10-CM

## 2022-08-15 RX ORDER — HYDROCODONE BITARTRATE AND ACETAMINOPHEN 5; 325 MG/1; MG/1
1 TABLET ORAL
Qty: 30 TABLET | Refills: 0 | Status: SHIPPED | OUTPATIENT
Start: 2022-08-15 | End: 2022-08-25

## 2022-08-15 RX ORDER — GABAPENTIN 100 MG/1
CAPSULE ORAL
Qty: 90 CAPSULE | Refills: 2 | Status: SHIPPED | OUTPATIENT
Start: 2022-08-15

## 2022-08-15 NOTE — TELEPHONE ENCOUNTER
Referral to Dr. [facility], diagnosis acute low back pain with vertebral fracture and herniated disc at L1-L2.   Order bone density study at facility of choice diagnosis: Vertebral fracture at L1

## 2022-08-15 NOTE — TELEPHONE ENCOUNTER
Let him know his MRI confirms a compression fracture of his first lumbar vertebra. It also shows a new bulging disc at the level below it, could be contributing to his pain. I recommend starting gabapentin that will help with any pinched nerve pain, a bone density study to check for osteoporosis, and referral to a spine specialist to see if any Outpatient procedures could help his pain. Let me know if okay with him to send prescription, send referral to spine center, and order bone density. Where would he like his bone density scheduled?

## 2022-08-15 NOTE — TELEPHONE ENCOUNTER
Patient notified of MD's note.  Patient would like gabapentin sent in and bone density study to go to [facility] and yes to referral.

## 2022-08-17 RX ORDER — LOSARTAN POTASSIUM 50 MG/1
100 TABLET ORAL DAILY
Qty: 180 TABLET | Refills: 0 | Status: SHIPPED | OUTPATIENT
Start: 2022-08-17 | End: 2022-10-24

## 2022-08-17 NOTE — TELEPHONE ENCOUNTER
Last Visit: 7/25/22 with MD Alexandre Rincon  Next Appointment: 9/1/22 with MD Alexandre Rincon  Previous Refill Encounter(s): 3/1/22 #180 with 1 refill    Requested Prescriptions     Pending Prescriptions Disp Refills    losartan (COZAAR) 50 mg tablet 180 Tablet 1     Sig: Take 2 Tablets by mouth daily. For [facility] in place:   Recommendation Provided To:    Intervention Detail: New Rx: 1, reason: Patient Preference  Gap Closed?:   Intervention Accepted By:   Time Spent (min): 5

## 2022-08-22 DIAGNOSIS — S32.019A CLOSED FRACTURE OF FIRST LUMBAR VERTEBRA, UNSPECIFIED FRACTURE MORPHOLOGY, INITIAL ENCOUNTER (HCC): ICD-10-CM

## 2022-08-23 RX ORDER — ROSUVASTATIN CALCIUM 10 MG/1
10 TABLET, COATED ORAL DAILY
Qty: 90 TABLET | Refills: 1 | Status: SHIPPED | OUTPATIENT
Start: 2022-08-23

## 2022-08-23 NOTE — TELEPHONE ENCOUNTER
Last Visit: 7/25/22 with MD Cherie Everett  Next Appointment: 9/1/22 with MD Cherie Everett  Previous Refill Encounter(s): 3/3/22 #90 with 1 refill    Requested Prescriptions     Pending Prescriptions Disp Refills    rosuvastatin (Crestor) 10 mg tablet 90 Tablet 1     Sig: Take 1 Tablet by mouth daily. For [facility] in place:   Recommendation Provided To:    Intervention Detail: New Rx: 1, reason: Patient Preference  Gap Closed?:   Intervention Accepted By:   Time Spent (min): 5

## 2022-08-29 ENCOUNTER — HOSPITAL ENCOUNTER (OUTPATIENT)
Dept: BONE DENSITY | Age: 77
Discharge: HOME | End: 2022-08-29
Attending: INTERNAL MEDICINE
Payer: MEDICARE

## 2022-08-29 PROCEDURE — 77080 DXA BONE DENSITY AXIAL: CPT

## 2022-09-01 ENCOUNTER — OFFICE VISIT (OUTPATIENT)
Dept: INTERNAL MEDICINE CLINIC | Age: 77
End: 2022-09-01
Payer: MEDICARE

## 2022-09-01 VITALS
RESPIRATION RATE: 18 BRPM | SYSTOLIC BLOOD PRESSURE: 129 MMHG | TEMPERATURE: 98.4 F | HEIGHT: 71 IN | HEART RATE: 61 BPM | BODY MASS INDEX: 29.12 KG/M2 | DIASTOLIC BLOOD PRESSURE: 74 MMHG | WEIGHT: 208 LBS | OXYGEN SATURATION: 98 %

## 2022-09-01 DIAGNOSIS — Z87.81 HISTORY OF VERTEBRAL FRACTURE: Primary | ICD-10-CM

## 2022-09-01 DIAGNOSIS — I10 ESSENTIAL HYPERTENSION: ICD-10-CM

## 2022-09-01 PROCEDURE — G8510 SCR DEP NEG, NO PLAN REQD: HCPCS | Performed by: INTERNAL MEDICINE

## 2022-09-01 PROCEDURE — 1123F ACP DISCUSS/DSCN MKR DOCD: CPT | Performed by: INTERNAL MEDICINE

## 2022-09-01 PROCEDURE — 99214 OFFICE O/P EST MOD 30 MIN: CPT | Performed by: INTERNAL MEDICINE

## 2022-09-01 PROCEDURE — G8536 NO DOC ELDER MAL SCRN: HCPCS | Performed by: INTERNAL MEDICINE

## 2022-09-01 PROCEDURE — G8417 CALC BMI ABV UP PARAM F/U: HCPCS | Performed by: INTERNAL MEDICINE

## 2022-09-01 PROCEDURE — 1101F PT FALLS ASSESS-DOCD LE1/YR: CPT | Performed by: INTERNAL MEDICINE

## 2022-09-01 PROCEDURE — G8752 SYS BP LESS 140: HCPCS | Performed by: INTERNAL MEDICINE

## 2022-09-01 PROCEDURE — G8754 DIAS BP LESS 90: HCPCS | Performed by: INTERNAL MEDICINE

## 2022-09-01 PROCEDURE — G0463 HOSPITAL OUTPT CLINIC VISIT: HCPCS | Performed by: INTERNAL MEDICINE

## 2022-09-01 PROCEDURE — G8427 DOCREV CUR MEDS BY ELIG CLIN: HCPCS | Performed by: INTERNAL MEDICINE

## 2022-09-01 RX ORDER — HYDROCODONE BITARTRATE AND ACETAMINOPHEN 10; 300 MG/1; MG/1
TABLET ORAL
COMMUNITY
End: 2022-09-01 | Stop reason: DRUGHIGH

## 2022-09-01 RX ORDER — HYDROCODONE BITARTRATE AND ACETAMINOPHEN 5; 325 MG/1; MG/1
1 TABLET ORAL
Qty: 42 TABLET | Refills: 0 | Status: SHIPPED | OUTPATIENT
Start: 2022-09-01 | End: 2022-10-01

## 2022-09-01 RX ORDER — HYDROCODONE BITARTRATE AND ACETAMINOPHEN 5; 325 MG/1; MG/1
1 TABLET ORAL
Qty: 42 TABLET | Refills: 0 | Status: SHIPPED | OUTPATIENT
Start: 2022-09-01 | End: 2022-09-01 | Stop reason: SDUPTHER

## 2022-09-01 NOTE — PROGRESS NOTES
Romaine Rodríguez presents today for   Chief Complaint   Patient presents with    Results     MRI       Is someone accompanying this pt? no    Is the patient using any DME equipment during OV? no    Depression Screening:  3 most recent PHQ Screens 9/1/2022   Little interest or pleasure in doing things Not at all   Feeling down, depressed, irritable, or hopeless Not at all   Total Score PHQ 2 0       Learning Assessment:  No flowsheet data found. Abuse Screening:  Abuse Screening Questionnaire 9/1/2022   Do you ever feel afraid of your partner? N   Are you in a relationship with someone who physically or mentally threatens you? N   Is it safe for you to go home? Y       Fall Risk  Fall Risk Assessment, last 12 mths 9/1/2022   Able to walk? Yes   Fall in past 12 months? 0   Do you feel unsteady? 0   Are you worried about falling 0       ADL  ADL Assessment 5/11/2021   Feeding yourself No Help Needed   Getting from bed to chair No Help Needed   Getting dressed No Help Needed   Bathing or showering No Help Needed   Walk across the room (includes cane/walker) No Help Needed   Using the telphone No Help Needed   Taking your medications No Help Needed   Preparing meals No Help Needed   Managing money (expenses/bills) No Help Needed   Moderately strenuous housework (laundry) No Help Needed   Shopping for personal items (toiletries/medicines) No Help Needed   Shopping for groceries No Help Needed   Driving No Help Needed   Climbing a flight of stairs No Help Needed   Getting to places beyond walking distances No Help Needed       Health Maintenance reviewed and discussed and ordered per Provider. Health Maintenance Due   Topic Date Due    DTaP/Tdap/Td series (1 - Tdap) Never done    Shingrix Vaccine Age 50> (1 of 2) Never done    Lipid Screen  02/13/2021    COVID-19 Vaccine (4 - Booster) 01/26/2022    Flu Vaccine (1) 09/01/2022   . Coordination of Care:  1.  Have you been to the ER, urgent care clinic since your last visit? Hospitalized since your last visit? no    2. Have you seen or consulted any other health care providers outside of the [address] [place] [address] since your last visit? Include any pap smears or colon screening. no    3. For patients aged 39-70: Has the patient had a colonoscopy? NA - based on age     If the patient is female:    4. For patients aged 41-77: Has the patient had a mammogram within the past 2 years? NA - based on age    11. For patients aged 21-65: Has the patient had a pap smear?  NA - based on age

## 2022-09-01 NOTE — PROGRESS NOTES
JEAN     Julio Cesar Peña is here for follow-up of his acute L1 vertebral fracture, along with a new disc herniation at L2-3. He is on gabapentin, and has Clayton that he can take 3 times a day. His morning Norco wears off after about 3 hours. He denies any excessive drowsiness, or constipation with the medication. On questioning, he would like to be able to take it a little bit more frequently. He will be seeing a spine specialist in the near future. He had his DEXA scan 3 days ago, results were not back in, but advised him I will let him know as soon as I get them. Past Medical History:   Diagnosis Date    GERD (gastroesophageal reflux disease)     HLD (hyperlipidemia)     Hypertension     Tubular adenoma of colon         Past Surgical History:   Procedure Laterality Date    HX KNEE REPLACEMENT Left     HX LAP CHOLECYSTECTOMY      NEUROLOGICAL PROCEDURE UNLISTED      cyst removed from lower back above tail bone        Current Outpatient Medications   Medication Sig Dispense Refill    HYDROcodone-acetaminophen (NORCO) 5-325 mg per tablet Take 1 Tablet by mouth every four (4) hours as needed for Pain for up to 30 days. Max Daily Amount: 6 Tablets. 42 Tablet 0    rosuvastatin (Crestor) 10 mg tablet Take 1 Tablet by mouth daily. 90 Tablet 1    losartan (COZAAR) 50 mg tablet Take 2 Tablets by mouth daily. 180 Tablet 0    gabapentin (NEURONTIN) 100 mg capsule 1 p.o. nightly x1 night, then 2 p.o. nightly x1 night, then 3 p.o. nightly. Warning: Sedating 90 Capsule 2    ibuprofen (MOTRIN) 200 mg tablet Take  by mouth.  acetaminophen (TYLENOL) 650 mg TbER Take 650 mg by mouth every eight (8) hours.  cholecalciferol (VITAMIN D3) (2,000 UNITS /50 MCG) cap capsule Take  by mouth daily.  cyanocobalamin 1,000 mcg tablet Take 2,500 mcg by mouth daily.  pantoprazole (PROTONIX) 40 mg tablet Take 1 Tablet by mouth daily.  90 Tablet 1        No Known Allergies     Social History     Socioeconomic History    Marital status:      Spouse name: Not on file    Number of children: Not on file    Years of education: Not on file    Highest education level: Not on file   Occupational History    Not on file   Tobacco Use    Smoking status: Former    Smokeless tobacco: Former   Substance and Sexual Activity    Alcohol use: Yes     Comment: occ    Drug use: No    Sexual activity: Not on file   Other Topics Concern    Not on file   Social History Narrative    Not on file     Social Determinants of Health     Financial Resource Strain: Not on file   Food Insecurity: Not on file   Transportation Needs: Not on file   Physical Activity: Not on file   Stress: Not on file   Social Connections: Not on file   Intimate Partner Violence: Not on file   Housing Stability: Not on file        Family History   Problem Relation Age of Onset    Alzheimer's Disease Mother          at age 80    Lung Disease Father         , smoker--  at 72    Other Sister         Stephanie Bennett    Thyroid Cancer Daughter     Other Son         low testosterone           Visit Vitals  /74   Pulse 61   Temp 98.4 °F (36.9 °C) (Temporal)   Resp 18   Ht 5' 11\" (1.803 m)   Wt 208 lb (94.3 kg)   SpO2 98%   BMI 29.01 kg/m²        Review of Systems   Constitutional:  Negative for chills and fever. No drenching night sweats. Denies malaise. Respiratory:  Negative for shortness of breath. Cardiovascular:  Negative for chest pain. Gastrointestinal:  Negative for blood in stool. Genitourinary:  Negative for hematuria. Musculoskeletal:  Positive for back pain. Psychiatric/Behavioral:  Negative for depression. The patient is not nervous/anxious. Physical Exam  Constitutional:       General: He is in acute distress. Eyes:      General: No scleral icterus. Conjunctiva/sclera: Conjunctivae normal.   Neck:      Comments: No cervical or supraclavicular lymphadenopathy.   Cardiovascular:      Rate and Rhythm: Normal rate and regular rhythm. Pulmonary:      Effort: Pulmonary effort is normal.      Breath sounds: Normal breath sounds. No stridor. Musculoskeletal:      Cervical back: Neck supple. Comments: No clubbing or cyanosis, calves nontender, no cords. Skin:     General: Skin is warm and dry. Neurological:      Mental Status: He is alert and oriented to person, place, and time. Psychiatric:         Mood and Affect: Mood normal.                Diagnoses and all orders for this visit:    1. History of vertebral fracture  Comments:  signif pain. await DEXA continue gabapentin, increase hydrocodone to q4h prn, watch for drowsiness/ constipation, has spine appointment  Orders:  -     HYDROcodone-acetaminophen (NORCO) 5-325 mg per tablet; Take 1 Tablet by mouth every four (4) hours as needed for Pain for up to 30 days. Max Daily Amount: 6 Tablets. 2. Essential hypertension  Comments:  Controlled, continue medication.                   Alyssa Quezada MD

## 2022-09-06 ENCOUNTER — TELEPHONE (OUTPATIENT)
Dept: INTERNAL MEDICINE CLINIC | Age: 77
End: 2022-09-06

## 2022-09-06 NOTE — TELEPHONE ENCOUNTER
Patient is aware:    His bone density study shows thinning bones (osteopenia) in both hips. His calculated risk of fracturing a hip before he turns 87 is about 4.5%, anything over 3% it is recommended to put on bone building medications. He has already fractured 1 bone, we want to prevent another 1 from breaking. I recommend Fosamax or Actonel to help build bone, he can look into those and call me, or we can talk about it at his visit next month, either 1. Patient verbalizes understanding.

## 2022-09-06 NOTE — TELEPHONE ENCOUNTER
His bone density study shows thinning bones (osteopenia) in both hips. His calculated risk of fracturing a hip before he turns 87 is about 4.5%, anything over 3% it is recommended to put on bone building medications. He has already fractured 1 bone, we want to prevent another 1 from breaking. I recommend Fosamax or Actonel to help build bone, he can look into those and call me, or we can talk about it at his visit next month, either 1. Warm

## 2022-09-16 ENCOUNTER — TELEPHONE (OUTPATIENT)
Dept: INTERNAL MEDICINE CLINIC | Age: 77
End: 2022-09-16

## 2022-09-22 RX ORDER — PANTOPRAZOLE SODIUM 40 MG/1
40 TABLET, DELAYED RELEASE ORAL DAILY
Qty: 90 TABLET | Refills: 3 | Status: SHIPPED | OUTPATIENT
Start: 2022-09-22

## 2022-09-22 NOTE — TELEPHONE ENCOUNTER
Last Visit: 9/1/22 with MD Daphne Gallego  Next Appointment: 10/25/22 with MD Daphne Gallego  Previous Refill Encounter(s): 4/4/22 #90 with 1 refill    Requested Prescriptions     Pending Prescriptions Disp Refills    pantoprazole (PROTONIX) 40 mg tablet 90 Tablet 3     Sig: Take 1 Tablet by mouth daily. For [facility] in place:   Recommendation Provided To:    Intervention Detail: New Rx: 1, reason: Patient Preference  Gap Closed?:   Intervention Accepted By:   Time Spent (min): 5

## 2022-10-04 ENCOUNTER — TELEPHONE (OUTPATIENT)
Dept: INTERNAL MEDICINE CLINIC | Age: 77
End: 2022-10-04

## 2022-10-05 ENCOUNTER — OFFICE VISIT (OUTPATIENT)
Dept: INTERNAL MEDICINE CLINIC | Age: 77
End: 2022-10-05
Payer: MEDICARE

## 2022-10-05 ENCOUNTER — HOSPITAL ENCOUNTER (OUTPATIENT)
Dept: GENERAL RADIOLOGY | Age: 77
Discharge: HOME | End: 2022-10-05
Payer: MEDICARE

## 2022-10-05 VITALS
SYSTOLIC BLOOD PRESSURE: 139 MMHG | TEMPERATURE: 98.7 F | DIASTOLIC BLOOD PRESSURE: 87 MMHG | HEIGHT: 69 IN | RESPIRATION RATE: 16 BRPM | WEIGHT: 207 LBS | HEART RATE: 69 BPM | OXYGEN SATURATION: 98 % | BODY MASS INDEX: 30.66 KG/M2

## 2022-10-05 DIAGNOSIS — M25.512 ACUTE PAIN OF LEFT SHOULDER: Primary | ICD-10-CM

## 2022-10-05 DIAGNOSIS — M25.512 ACUTE PAIN OF LEFT SHOULDER: ICD-10-CM

## 2022-10-05 PROCEDURE — G0463 HOSPITAL OUTPT CLINIC VISIT: HCPCS | Performed by: INTERNAL MEDICINE

## 2022-10-05 PROCEDURE — 1123F ACP DISCUSS/DSCN MKR DOCD: CPT | Performed by: INTERNAL MEDICINE

## 2022-10-05 PROCEDURE — 73030 X-RAY EXAM OF SHOULDER: CPT

## 2022-10-05 PROCEDURE — 99212 OFFICE O/P EST SF 10 MIN: CPT | Performed by: INTERNAL MEDICINE

## 2022-10-05 RX ORDER — HYDROCODONE BITARTRATE AND ACETAMINOPHEN 5; 325 MG/1; MG/1
TABLET ORAL
COMMUNITY

## 2022-10-05 NOTE — ASSESSMENT & PLAN NOTE
To continue with ice pack, Norco/Advil as needed for pain. To avoid painful movement of left shoulder.

## 2022-10-05 NOTE — PROGRESS NOTES
Kat Doctor is a 65y.o. year old male who is a new patient to me today. He was previous followed by Dr Lyric Burleson, his PCP. Subjective: Ziyadtammy Doctor was seen today for Shoulder Pain (Left shoulder pain x 3 days.)    Patient had a fall about 10 weeks ago resulting in compression fracture in lumbar vertebra patient underwent kyphoplasty. 2 days ago patient started to have left shoulder pain. Patient does not mention about any recent falls or injury. Patient had been using ice packs and Norco and Advil as needed for pain though the pain is not bad. There is minimal pain at rest, more so with abduction and trying to behind his back.     Past Medical History:   Diagnosis Date    GERD (gastroesophageal reflux disease)     HLD (hyperlipidemia)     Hypertension     Tubular adenoma of colon        Past Surgical History:   Procedure Laterality Date    HX KNEE REPLACEMENT Left     HX LAP CHOLECYSTECTOMY      NEUROLOGICAL PROCEDURE UNLISTED      cyst removed from lower back above tail bone       Family History   Problem Relation Age of Onset    Alzheimer's Disease Mother          at age 80    Lung Disease Father         , smoker--  at 72    Other Sister         Guillain Sandwich    Thyroid Cancer Daughter     Other Son         low testosterone       Social History     Socioeconomic History    Marital status:      Spouse name: Not on file    Number of children: Not on file    Years of education: Not on file    Highest education level: Not on file   Occupational History    Not on file   Tobacco Use    Smoking status: Former    Smokeless tobacco: Former   Substance and Sexual Activity    Alcohol use: Yes     Comment: occ    Drug use: No    Sexual activity: Not on file   Other Topics Concern    Not on file   Social History Narrative    Not on file     Social Determinants of Health     Financial Resource Strain: Not on file   Food Insecurity: Not on file   Transportation Needs: Not on file Physical Activity: Not on file   Stress: Not on file   Social Connections: Not on file   Intimate Partner Violence: Not on file   Housing Stability: Not on file       No Known Allergies    Current Outpatient Medications on File Prior to Visit   Medication Sig Dispense Refill    HYDROcodone-acetaminophen (NORCO) 5-325 mg per tablet Take  by mouth every six (6) hours as needed for Pain.      pantoprazole (PROTONIX) 40 mg tablet Take 1 Tablet by mouth daily. 90 Tablet 3    rosuvastatin (Crestor) 10 mg tablet Take 1 Tablet by mouth daily. 90 Tablet 1    losartan (COZAAR) 50 mg tablet Take 2 Tablets by mouth daily. 180 Tablet 0    gabapentin (NEURONTIN) 100 mg capsule 1 p.o. nightly x1 night, then 2 p.o. nightly x1 night, then 3 p.o. nightly. Warning: Sedating (Patient taking differently: 1 p.o. nightly x1 night, then 2 p.o. nightly x1 night, then 3 p.o. nightly. Warning: Sedating) 90 Capsule 2    ibuprofen (MOTRIN) 200 mg tablet Take  by mouth. acetaminophen (TYLENOL) 650 mg TbER Take 650 mg by mouth every eight (8) hours. cholecalciferol (VITAMIN D3) (2,000 UNITS /50 MCG) cap capsule Take  by mouth daily. cyanocobalamin 1,000 mcg tablet Take 2,500 mcg by mouth daily. No current facility-administered medications on file prior to visit. ROS as above        Objective:     Vitals:    10/05/22 0827 10/05/22 0829   BP: (!) 143/75 139/87   Pulse: 69    Resp: 16    Temp: 98.7 °F (37.1 °C)    TempSrc: Temporal    SpO2: 98%    Weight: 207 lb (93.9 kg)    Height: 5' 9\" (1.753 m)       Physical Exam  Cardiovascular:      Rate and Rhythm: Normal rate and regular rhythm. Pulses: Normal pulses. Heart sounds: Normal heart sounds. Pulmonary:      Effort: Pulmonary effort is normal.      Breath sounds: Normal breath sounds. Musculoskeletal:      Comments: Limited range of motion left shoulder. Pain on internal rotation and abduction.           Labs:     Results for orders placed or performed during the hospital encounter of 07/25/22   CULTURE, URINE    Specimen: Urine   Result Value Ref Range    Special Requests: NO SPECIAL REQUESTS      Jacobson Count <10,000  COLONIES/mL        Culture result: GRAM NEGATIVE RODS (A)            Active Problems:     Patient Active Problem List    Diagnosis    Acute midline thoracic back pain    Dry cough    HLD (hyperlipidemia)    Hypertension    Tubular adenoma of colon    GERD (gastroesophageal reflux disease)    Hypogonadism in male    Hematuria    Lumbar facet arthropathy    Neuritis    Lumbar spinal stenosis    Bilateral renal cysts       Assessment & Plan:     Diagnoses and all orders for this visit:    1. Acute pain of left shoulder  Assessment & Plan: To continue with ice pack, Norco/Advil as needed for pain. To avoid painful movement of left shoulder. Orders:  -     REFERRAL TO ORTHOPEDICS  -     XR SHOULDER LT AP/LAT MIN 2 V; Future      Follow-up and Dispositions    Return if symptoms worsen or fail to improve. Disclaimer: The patient understands our medical plan. Alternatives have been explained and offered. The risks, benefits and significant side effects of all medications have been reviewed. Anticipated time course and progression of condition reviewed. All questions have been addressed. He is encouraged to employ the information provided in the after visit summary, which was reviewed. Where applicable, he is instructed to call the clinic if he has not been notified either by phone or through [address] with the results of his tests or with an appointment plan for any referrals within 1 week(s). No news is not good news; it's no news. The patient  is to call if his condition worsens or fails to improve or if significant side effects are experienced.            Vinicio Watkins MD

## 2022-10-05 NOTE — PROGRESS NOTES
Oskar Villarreal presents today for No chief complaint on file. 1. \"Have you been to the ER, urgent care clinic since your last visit? Hospitalized since your last visit? \" no    2. \"Have you seen or consulted any other health care providers outside of the [address] [place] [address] since your last visit? \" no     3. For patients aged 39-70: Has the patient had a colonoscopy / FIT/ Cologuard? NA - based on age      If the patient is female:    4. For patients aged 41-77: Has the patient had a mammogram within the past 2 years? NA - based on age or sex  See top three    5. For patients aged 21-65: Has the patient had a pap smear?  NA - based on age or sex

## 2022-10-06 ENCOUNTER — TELEPHONE (OUTPATIENT)
Dept: INTERNAL MEDICINE CLINIC | Age: 77
End: 2022-10-06

## 2022-10-06 NOTE — TELEPHONE ENCOUNTER
----- Message from Brooke Rosen MD sent at 10/5/2022 10:48 PM EDT -----  Pl inform the patient that X Sharlette Kimble show no fracture/ dislocation.   Thanks

## 2022-10-21 ENCOUNTER — HOSPITAL ENCOUNTER (OUTPATIENT)
Dept: LAB | Age: 77
Discharge: HOME | End: 2022-10-21
Payer: MEDICARE

## 2022-10-21 DIAGNOSIS — I10 ESSENTIAL HYPERTENSION: ICD-10-CM

## 2022-10-21 LAB
ALBUMIN SERPL-MCNC: 3.8 G/DL (ref 3.4–5)
ALBUMIN/GLOB SERPL: 1.2 {RATIO} (ref 0.8–1.7)
ALP SERPL-CCNC: 137 U/L (ref 45–117)
ALT SERPL-CCNC: 33 U/L (ref 16–61)
ANION GAP SERPL CALC-SCNC: 4 MMOL/L (ref 3–18)
APPEARANCE UR: CLEAR
AST SERPL-CCNC: 27 U/L (ref 10–38)
BACTERIA URNS QL MICRO: NEGATIVE /HPF
BILIRUB SERPL-MCNC: 0.8 MG/DL (ref 0.2–1)
BILIRUB UR QL: NEGATIVE
BUN SERPL-MCNC: 14 MG/DL (ref 7–18)
BUN/CREAT SERPL: 16 (ref 12–20)
CALCIUM SERPL-MCNC: 9.1 MG/DL (ref 8.5–10.1)
CHLORIDE SERPL-SCNC: 106 MMOL/L (ref 100–111)
CHOLEST SERPL-MCNC: 181 MG/DL
CO2 SERPL-SCNC: 30 MMOL/L (ref 21–32)
COLOR UR: YELLOW
CREAT SERPL-MCNC: 0.9 MG/DL (ref 0.6–1.3)
EPITH CASTS URNS QL MICRO: ABNORMAL /LPF (ref 0–5)
GLOBULIN SER CALC-MCNC: 3.1 G/DL (ref 2–4)
GLUCOSE SERPL-MCNC: 90 MG/DL (ref 74–99)
GLUCOSE UR STRIP.AUTO-MCNC: NEGATIVE MG/DL
HDLC SERPL-MCNC: 118 MG/DL (ref 40–60)
HDLC SERPL: 1.5 {RATIO} (ref 0–5)
HGB UR QL STRIP: ABNORMAL
KETONES UR QL STRIP.AUTO: ABNORMAL MG/DL
LDLC SERPL CALC-MCNC: 50.8 MG/DL (ref 0–100)
LEUKOCYTE ESTERASE UR QL STRIP.AUTO: NEGATIVE
LIPID PROFILE,FLP: ABNORMAL
MUCOUS THREADS URNS QL MICRO: ABNORMAL /LPF
NITRITE UR QL STRIP.AUTO: NEGATIVE
PH UR STRIP: 5.5 [PH] (ref 5–8)
POTASSIUM SERPL-SCNC: 4.1 MMOL/L (ref 3.5–5.5)
PROT SERPL-MCNC: 6.9 G/DL (ref 6.4–8.2)
PROT UR STRIP-MCNC: NEGATIVE MG/DL
RBC #/AREA URNS HPF: ABNORMAL /HPF (ref 0–5)
SODIUM SERPL-SCNC: 140 MMOL/L (ref 136–145)
SP GR UR REFRACTOMETRY: 1.02 (ref 1–1.03)
TRIGL SERPL-MCNC: 61 MG/DL (ref ?–150)
UROBILINOGEN UR QL STRIP.AUTO: 1 EU/DL (ref 0.2–1)
VLDLC SERPL CALC-MCNC: 12.2 MG/DL
WBC URNS QL MICRO: ABNORMAL /HPF (ref 0–4)

## 2022-10-21 PROCEDURE — 81001 URINALYSIS AUTO W/SCOPE: CPT

## 2022-10-21 PROCEDURE — 36415 COLL VENOUS BLD VENIPUNCTURE: CPT

## 2022-10-21 PROCEDURE — 80053 COMPREHEN METABOLIC PANEL: CPT

## 2022-10-21 PROCEDURE — 80061 LIPID PANEL: CPT

## 2022-10-24 RX ORDER — LOSARTAN POTASSIUM 50 MG/1
TABLET ORAL
Qty: 180 TABLET | Refills: 3 | Status: SHIPPED | OUTPATIENT
Start: 2022-10-24

## 2022-10-25 ENCOUNTER — OFFICE VISIT (OUTPATIENT)
Dept: INTERNAL MEDICINE CLINIC | Age: 77
End: 2022-10-25
Payer: MEDICARE

## 2022-10-25 VITALS
BODY MASS INDEX: 31.1 KG/M2 | HEIGHT: 69 IN | TEMPERATURE: 97.3 F | HEART RATE: 61 BPM | DIASTOLIC BLOOD PRESSURE: 64 MMHG | SYSTOLIC BLOOD PRESSURE: 133 MMHG | RESPIRATION RATE: 18 BRPM | WEIGHT: 210 LBS | OXYGEN SATURATION: 99 %

## 2022-10-25 DIAGNOSIS — R26.9 GAIT DISTURBANCE: ICD-10-CM

## 2022-10-25 DIAGNOSIS — Z98.890 S/P REPAIR OF VERTEBRAL FRACTURE: ICD-10-CM

## 2022-10-25 DIAGNOSIS — R41.3 MEMORY LOSS: Primary | ICD-10-CM

## 2022-10-25 DIAGNOSIS — Z87.81 S/P REPAIR OF VERTEBRAL FRACTURE: ICD-10-CM

## 2022-10-25 DIAGNOSIS — I10 ESSENTIAL HYPERTENSION: ICD-10-CM

## 2022-10-25 DIAGNOSIS — Z23 NEEDS FLU SHOT: ICD-10-CM

## 2022-10-25 PROCEDURE — G8752 SYS BP LESS 140: HCPCS | Performed by: INTERNAL MEDICINE

## 2022-10-25 PROCEDURE — 3074F SYST BP LT 130 MM HG: CPT | Performed by: INTERNAL MEDICINE

## 2022-10-25 PROCEDURE — 3078F DIAST BP <80 MM HG: CPT | Performed by: INTERNAL MEDICINE

## 2022-10-25 PROCEDURE — G8510 SCR DEP NEG, NO PLAN REQD: HCPCS | Performed by: INTERNAL MEDICINE

## 2022-10-25 PROCEDURE — 1123F ACP DISCUSS/DSCN MKR DOCD: CPT | Performed by: INTERNAL MEDICINE

## 2022-10-25 PROCEDURE — 1101F PT FALLS ASSESS-DOCD LE1/YR: CPT | Performed by: INTERNAL MEDICINE

## 2022-10-25 PROCEDURE — G8754 DIAS BP LESS 90: HCPCS | Performed by: INTERNAL MEDICINE

## 2022-10-25 PROCEDURE — G8417 CALC BMI ABV UP PARAM F/U: HCPCS | Performed by: INTERNAL MEDICINE

## 2022-10-25 PROCEDURE — G0463 HOSPITAL OUTPT CLINIC VISIT: HCPCS | Performed by: INTERNAL MEDICINE

## 2022-10-25 PROCEDURE — G8427 DOCREV CUR MEDS BY ELIG CLIN: HCPCS | Performed by: INTERNAL MEDICINE

## 2022-10-25 PROCEDURE — 90694 VACC AIIV4 NO PRSRV 0.5ML IM: CPT | Performed by: INTERNAL MEDICINE

## 2022-10-25 PROCEDURE — 99214 OFFICE O/P EST MOD 30 MIN: CPT | Performed by: INTERNAL MEDICINE

## 2022-10-25 PROCEDURE — G8536 NO DOC ELDER MAL SCRN: HCPCS | Performed by: INTERNAL MEDICINE

## 2022-10-25 NOTE — PROGRESS NOTES
HPI     Is here for follow-up of his hypertension, also complains of not feeling \"sharp\" mentally as he has in the past.  He has to concentrate very hard to take the right exits near their new home at [facility]. His wife also voiced some concerns about his memory to me when I saw her here a few weeks ago. He denies taking any significant pain medications. He still feels off balance, and attributes his shuffling gait to this. He had a kyphoplasty of his vertebral fracture, still has some pain, but is not really taking any significant amount of pain medication. Dr. Lory Thompson of neurology about this time last year, for shuffling gait, no distinct etiology was found. He did not have any memory issues at the time. Past Medical History:   Diagnosis Date    GERD (gastroesophageal reflux disease)     HLD (hyperlipidemia)     Hypertension     Tubular adenoma of colon         Past Surgical History:   Procedure Laterality Date    HX KNEE REPLACEMENT Left     HX LAP CHOLECYSTECTOMY      NEUROLOGICAL PROCEDURE UNLISTED      cyst removed from lower back above tail bone        Current Outpatient Medications   Medication Sig Dispense Refill    losartan (COZAAR) 50 mg tablet TAKE 2 TABLETS DAILY 180 Tablet 3    HYDROcodone-acetaminophen (NORCO) 5-325 mg per tablet Take  by mouth every six (6) hours as needed for Pain.      pantoprazole (PROTONIX) 40 mg tablet Take 1 Tablet by mouth daily. 90 Tablet 3    rosuvastatin (Crestor) 10 mg tablet Take 1 Tablet by mouth daily. 90 Tablet 1    gabapentin (NEURONTIN) 100 mg capsule 1 p.o. nightly x1 night, then 2 p.o. nightly x1 night, then 3 p.o. nightly. Warning: Sedating (Patient taking differently: 1 p.o. nightly x1 night, then 2 p.o. nightly x1 night, then 3 p.o. nightly. Warning: Sedating) 90 Capsule 2    ibuprofen (MOTRIN) 200 mg tablet Take  by mouth. acetaminophen (TYLENOL) 650 mg TbER Take 650 mg by mouth every eight (8) hours.       cholecalciferol (VITAMIN D3) (2,000 UNITS /50 MCG) cap capsule Take  by mouth daily. cyanocobalamin 1,000 mcg tablet Take 2,500 mcg by mouth daily. No Known Allergies     Social History     Socioeconomic History    Marital status:      Spouse name: Not on file    Number of children: Not on file    Years of education: Not on file    Highest education level: Not on file   Occupational History    Not on file   Tobacco Use    Smoking status: Former    Smokeless tobacco: Former   Substance and Sexual Activity    Alcohol use: Yes     Comment: occ    Drug use: No    Sexual activity: Not on file   Other Topics Concern    Not on file   Social History Narrative    Not on file     Social Determinants of Health     Financial Resource Strain: Not on file   Food Insecurity: Not on file   Transportation Needs: Not on file   Physical Activity: Not on file   Stress: Not on file   Social Connections: Not on file   Intimate Partner Violence: Not on file   Housing Stability: Not on file        Family History   Problem Relation Age of Onset    Alzheimer's Disease Mother          at age 80    Lung Disease Father         , smoker--  at 72    Other Sister         Guilmarc [place]    Thyroid Cancer Daughter     Other Son         low testosterone           Visit Vitals  /64 (BP 1 Location: Right upper arm, BP Patient Position: Sitting, BP Cuff Size: Adult)   Pulse 61   Temp 97.3 °F (36.3 °C) (Temporal)   Resp 18   Ht 5' 9\" (1.753 m)   Wt 210 lb (95.3 kg)   SpO2 99%   BMI 31.01 kg/m²        Review of Systems   Eyes:  Negative for blurred vision. Respiratory:  Negative for shortness of breath. Cardiovascular:  Negative for chest pain. Gastrointestinal:  Negative for blood in stool. Genitourinary:  Negative for hematuria. Neurological:  Negative for focal weakness and headaches. Psychiatric/Behavioral:  Negative for depression. The patient is not nervous/anxious.       Physical Exam  Constitutional:       General: He is not in acute distress. HENT:      Head: Normocephalic and atraumatic. Eyes:      General: No scleral icterus. Conjunctiva/sclera: Conjunctivae normal.   Neck:      Comments: Carotid upstrokes normal to palpation. Cardiovascular:      Rate and Rhythm: Normal rate and regular rhythm. Heart sounds: No friction rub. No gallop. Pulmonary:      Effort: Pulmonary effort is normal.      Breath sounds: Normal breath sounds. Abdominal:      Palpations: Abdomen is soft. Tenderness: There is no abdominal tenderness. Musculoskeletal:      Cervical back: Neck supple. Comments: No clubbing, cyanosis, or significant edema. Calves nontender, no cords. There is mild cogwheeling of the right upper extremity. No tremor. Skin:     General: Skin is warm and dry. Neurological:      Mental Status: He is alert and oriented to person, place, and time. Psychiatric:         Mood and Affect: Mood normal.          MMSE 30/30      Diagnoses and all orders for this visit:    1. Memory loss  Comments:  Normal MMSE. Normal B12,MMA 1 yr ago. Shuffling gait, cogwheeling right arm, recommend return to  [facility], neurologist for further eval, r/o Parkinsons    2. Essential hypertension  Comments:  controlled, continue medications. 3. Gait disturbance  Comments:  Shuffling gait, recommend physical therapy, patient will look into local PT venues and let me know, complaints of balance issues. Recent vertebral fracture. 4. S/P repair of vertebral fracture  Comments:  S /post kyphoplasty, still with some pain. 5. Needs flu shot  -     INFLUENZA, FLUAD, (AGE 65 Y+), IM, PF, 0.5 ML         Follow-up and Dispositions    Return in about 6 months (around 4/25/2023) for bp.               Lidna Coombs MD

## 2022-10-25 NOTE — PATIENT INSTRUCTIONS
Vaccine Information Statement    Influenza (Flu) Vaccine (Inactivated or Recombinant): What You Need to Know    Many vaccine information statements are available in Greek and other languages. See www.immunize.org/vis. Hojas de información sobre vacunas están disponibles en español y en muchos otros idiomas. Visite www.immunize.org/vis. 1. Why get vaccinated? Influenza vaccine can prevent influenza (flu). Flu is a contagious disease that spreads around the [facility] every year, usually between October and May. Anyone can get the flu, but it is more dangerous for some people. Infants and young children, people 72 years and older, pregnant people, and people with certain health conditions or a weakened immune system are at greatest risk of flu complications. Pneumonia, bronchitis, sinus infections, and ear infections are examples of flu-related complications. If you have a medical condition, such as heart disease, cancer, or diabetes, flu can make it worse. Flu can cause fever and chills, sore throat, muscle aches, fatigue, cough, headache, and runny or stuffy nose. Some people may have vomiting and diarrhea, though this is more common in children than adults. In an average year, thousands of people in the [facility] die from flu, and many more are hospitalized. Flu vaccine prevents millions of illnesses and flu-related visits to the doctor each year. 2. Influenza vaccines     CDC recommends everyone 6 months and older get vaccinated every flu season. Children 6 months through 6years of age may need 2 doses during a single flu season. Everyone else needs only 1 dose each flu season. It takes about 2 weeks for protection to develop after vaccination. There are many flu viruses, and they are always changing. Each year a new flu vaccine is made to protect against the influenza viruses believed to be likely to cause disease in the upcoming flu season.  Even when the vaccine doesnt exactly match these viruses, it may still provide some protection. Influenza vaccine does not cause flu. Influenza vaccine may be given at the same time as other vaccines. 3. Talk with your health care provider    Tell your vaccination provider if the person getting the vaccine:  Has had an allergic reaction after a previous dose of influenza vaccine, or has any severe, life-threatening allergies   Has ever had Guillain-Barré Syndrome (also called GBS)    In some cases, your health care provider may decide to postpone influenza vaccination until a future visit. Influenza vaccine can be administered at any time during pregnancy. People who are or will be pregnant during influenza season should receive inactivated influenza vaccine. People with minor illnesses, such as a cold, may be vaccinated. People who are moderately or severely ill should usually wait until they recover before getting influenza vaccine. Your health care provider can give you more information. 4. Risks of a vaccine reaction    Soreness, redness, and swelling where the shot is given, fever, muscle aches, and headache can happen after influenza vaccination. There may be a very small increased risk of Guillain-Barré Syndrome (GBS) after inactivated influenza vaccine (the flu shot). Andres Anderson children who get the flu shot along with pneumococcal vaccine (PCV13) and/or DTaP vaccine at the same time might be slightly more likely to have a seizure caused by fever. Tell your health care provider if a child who is getting flu vaccine has ever had a seizure. People sometimes faint after medical procedures, including vaccination. Tell your provider if you feel dizzy or have vision changes or ringing in the ears. As with any medicine, there is a very remote chance of a vaccine causing a severe allergic reaction, other serious injury, or death. 5. What if there is a serious problem?     An allergic reaction could occur after the vaccinated person leaves the clinic. If you see signs of a severe allergic reaction (hives, swelling of the face and throat, difficulty breathing, a fast heartbeat, dizziness, or weakness), call 9-1-1 and get the person to the nearest hospital.    For other signs that concern you, call your health care provider. Adverse reactions should be reported to the Vaccine Adverse Event Reporting System (VAERS). Your health care provider will usually file this report, or you can do it yourself. Visit the VAERS website at www.vaers. [facility].gov or call 7-388.823.2894. VAERS is only for reporting reactions, and VAERS staff members do not give medical advice. 6. The National Vaccine Injury Compensation Program    The [facility] Vaccine Injury Compensation Program (VICP) is a federal program that was created to compensate people who may have been injured by certain vaccines. Claims regarding alleged injury or death due to vaccination have a time limit for filing, which may be as short as two years. Visit the VICP website at www.[facility].gov/vaccinecompensation or call 0-238.703.1320 to learn about the program and about filing a claim. 7. How can I learn more? Ask your health care provider. Call your local or state health department. Visit the website of the Food and Drug Administration (FDA) for vaccine package inserts and additional information at www.fda.gov/vaccines-blood-biologics/vaccines. Contact the Centers for Disease Control and Prevention (CDC): Call 5-452.650.7138 (0-770-JBO-INFO) or  Visit CDCs influenza website at www.cdc.gov/flu. Vaccine Information Statement   Inactivated Influenza Vaccine   8/6/2021  [address] [place] [address] 642ZR-05   Department of Health and Human Services  Centers for Disease Control and Prevention    Office Use Only

## 2022-10-25 NOTE — PROGRESS NOTES
Adina Hicks presents today for   Chief Complaint   Patient presents with    Hypertension       Is someone accompanying this pt? no    Is the patient using any DME equipment during OV? no    Depression Screening:  3 most recent PHQ Screens 10/25/2022   Little interest or pleasure in doing things Not at all   Feeling down, depressed, irritable, or hopeless Not at all   Total Score PHQ 2 0       Learning Assessment:  No flowsheet data found. Abuse Screening:  Abuse Screening Questionnaire 10/25/2022   Do you ever feel afraid of your partner? N   Are you in a relationship with someone who physically or mentally threatens you? N   Is it safe for you to go home? Y       Fall Risk  Fall Risk Assessment, last 12 mths 10/25/2022   Able to walk? Yes   Fall in past 12 months? 0   Do you feel unsteady? 0   Are you worried about falling 0       ADL  ADL Assessment 5/11/2021   Feeding yourself No Help Needed   Getting from bed to chair No Help Needed   Getting dressed No Help Needed   Bathing or showering No Help Needed   Walk across the room (includes cane/walker) No Help Needed   Using the telphone No Help Needed   Taking your medications No Help Needed   Preparing meals No Help Needed   Managing money (expenses/bills) No Help Needed   Moderately strenuous housework (laundry) No Help Needed   Shopping for personal items (toiletries/medicines) No Help Needed   Shopping for groceries No Help Needed   Driving No Help Needed   Climbing a flight of stairs No Help Needed   Getting to places beyond walking distances No Help Needed       Health Maintenance reviewed and discussed and ordered per Provider. Health Maintenance Due   Topic Date Due    DTaP/Tdap/Td series (1 - Tdap) Never done    Shingrix Vaccine Age 50> (1 of 2) Never done    COVID-19 Vaccine (4 - Booster) 01/26/2022    Flu Vaccine (1) 08/01/2022   . Coordination of Care:  1. Have you been to the ER, urgent care clinic since your last visit?  Hospitalized since your last visit? no    2. Have you seen or consulted any other health care providers outside of the [address] [place] [address] since your last visit? Include any pap smears or colon screening. no    3. For patients aged 39-70: Has the patient had a colonoscopy? NA - based on age     If the patient is female:    4. For patients aged 41-77: Has the patient had a mammogram within the past 2 years? NA - based on age    11. For patients aged 21-65: Has the patient had a pap smear? NA - based on age        Burgess Powell is a 65 y.o. male who presents for routine immunizations. He denies any symptoms , reactions or allergies that would exclude them from being immunized today. Risks and adverse reactions were discussed and the VIS was given to them. All questions were addressed. He was observed for 5 min post injection. There were no reactions observed. Verbal order for FLU IM received per Brigitte Evangelista MD for pt Burgess Powell. Order written down and read back to provider for verification prior to completion.

## 2022-12-13 DIAGNOSIS — M54.6 ACUTE MIDLINE THORACIC BACK PAIN: ICD-10-CM

## 2022-12-13 RX ORDER — GABAPENTIN 300 MG/1
300 CAPSULE ORAL
Qty: 90 CAPSULE | Refills: 1 | Status: SHIPPED | OUTPATIENT
Start: 2022-12-13

## 2022-12-13 RX ORDER — GABAPENTIN 100 MG/1
CAPSULE ORAL
Qty: 90 CAPSULE | Refills: 2 | Status: CANCELLED | OUTPATIENT
Start: 2022-12-13

## 2022-12-13 NOTE — TELEPHONE ENCOUNTER
Requested Prescriptions     Pending Prescriptions Disp Refills    gabapentin (NEURONTIN) 100 mg capsule 90 Capsule 2     Si p.o. nightly x1 night, then 2 p.o. nightly x1 night, then 3 p.o. nightly.   Warning: Sedating       90 day supply please

## 2022-12-13 NOTE — TELEPHONE ENCOUNTER
Spoke with patient he states he would prefer a prescription for Gabapentin 300 mg sent to [facility].

## 2022-12-13 NOTE — TELEPHONE ENCOUNTER
Rather than 3 gabapentin capsules of 100 mg nightly, prescription sent to [facility] for 300 mg capsule, 1 nightly

## 2022-12-13 NOTE — TELEPHONE ENCOUNTER
Please check with him, I received a request for 100 mg capsules of gabapentin, 3 at bedtime. If he is taking 3 of these every night, would he prefer a prescription for a 300 mg capsule instead?

## 2023-02-17 ENCOUNTER — HOSPITAL ENCOUNTER (OUTPATIENT)
Facility: HOSPITAL | Age: 78
End: 2023-02-17
Payer: MEDICARE

## 2023-02-17 LAB
ALBUMIN SERPL-MCNC: 3.9 G/DL (ref 3.4–5)
ALBUMIN/GLOB SERPL: 1.3 (ref 0.8–1.7)
ALP SERPL-CCNC: 86 U/L (ref 45–117)
ALT SERPL-CCNC: 16 U/L (ref 16–61)
ANION GAP SERPL CALC-SCNC: 5 MMOL/L (ref 3–18)
AST SERPL-CCNC: 14 U/L (ref 10–38)
BILIRUB SERPL-MCNC: 1.3 MG/DL (ref 0.2–1)
BUN SERPL-MCNC: 17 MG/DL (ref 7–18)
BUN/CREAT SERPL: 14 (ref 12–20)
CALCIUM SERPL-MCNC: 8.8 MG/DL (ref 8.5–10.1)
CHLORIDE SERPL-SCNC: 106 MMOL/L (ref 100–111)
CO2 SERPL-SCNC: 27 MMOL/L (ref 21–32)
CREAT SERPL-MCNC: 1.18 MG/DL (ref 0.6–1.3)
ERYTHROCYTE [DISTWIDTH] IN BLOOD BY AUTOMATED COUNT: 12 % (ref 11.6–14.5)
FOLATE SERPL-MCNC: 6.7 NG/ML (ref 3.1–17.5)
GLOBULIN SER CALC-MCNC: 2.9 G/DL (ref 2–4)
GLUCOSE SERPL-MCNC: 124 MG/DL (ref 74–99)
HCT VFR BLD AUTO: 42.8 % (ref 36–48)
HGB BLD-MCNC: 14.8 G/DL (ref 13–16)
MCH RBC QN AUTO: 33.8 PG (ref 24–34)
MCHC RBC AUTO-ENTMCNC: 34.6 G/DL (ref 31–37)
MCV RBC AUTO: 97.7 FL (ref 78–100)
NRBC # BLD: 0 K/UL (ref 0–0.01)
NRBC BLD-RTO: 0 PER 100 WBC
PLATELET # BLD AUTO: 170 K/UL (ref 135–420)
PMV BLD AUTO: 10.8 FL (ref 9.2–11.8)
POTASSIUM SERPL-SCNC: 4 MMOL/L (ref 3.5–5.5)
PROT SERPL-MCNC: 6.8 G/DL (ref 6.4–8.2)
RBC # BLD AUTO: 4.38 M/UL (ref 4.35–5.65)
SODIUM SERPL-SCNC: 138 MMOL/L (ref 136–145)
TSH SERPL DL<=0.05 MIU/L-ACNC: 1.3 UIU/ML (ref 0.36–3.74)
VIT B12 SERPL-MCNC: 1690 PG/ML (ref 211–911)
WBC # BLD AUTO: 5.9 K/UL (ref 4.6–13.2)

## 2023-02-17 PROCEDURE — 84443 ASSAY THYROID STIM HORMONE: CPT

## 2023-02-17 PROCEDURE — 82746 ASSAY OF FOLIC ACID SERUM: CPT

## 2023-02-17 PROCEDURE — 36415 COLL VENOUS BLD VENIPUNCTURE: CPT

## 2023-02-17 PROCEDURE — 84165 PROTEIN E-PHORESIS SERUM: CPT

## 2023-02-17 PROCEDURE — 85027 COMPLETE CBC AUTOMATED: CPT

## 2023-02-21 LAB
ALBUMIN SERPL ELPH-MCNC: 3.9 G/DL (ref 2.9–4.4)
ALBUMIN/GLOB SERPL: 1.6 (ref 0.7–1.7)
ALPHA1 GLOB SERPL ELPH-MCNC: 0.2 G/DL (ref 0–0.4)
ALPHA2 GLOB SERPL ELPH-MCNC: 0.6 G/DL (ref 0.4–1)
B-GLOBULIN SERPL ELPH-MCNC: 0.9 G/DL (ref 0.7–1.3)
GAMMA GLOB SERPL ELPH-MCNC: 0.7 G/DL (ref 0.4–1.8)
GLOBULIN SER-MCNC: 2.5 G/DL (ref 2.2–3.9)
IGA SERPL-MCNC: 266 MG/DL (ref 61–437)
IGG SERPL-MCNC: 827 MG/DL (ref 603–1613)
IGM SERPL-MCNC: 90 MG/DL (ref 15–143)
INTERPRETATION SERPL IEP-IMP: NORMAL
M PROTEIN SERPL ELPH-MCNC: NORMAL G/DL
PROT SERPL-MCNC: 6.4 G/DL (ref 6–8.5)

## 2023-03-08 DIAGNOSIS — E78.00 PURE HYPERCHOLESTEROLEMIA, UNSPECIFIED: Primary | ICD-10-CM

## 2023-03-08 RX ORDER — ROSUVASTATIN CALCIUM 10 MG/1
10 TABLET, COATED ORAL DAILY
Qty: 90 TABLET | Refills: 3 | Status: SHIPPED | OUTPATIENT
Start: 2023-03-08

## 2023-04-11 DIAGNOSIS — M48.061 SPINAL STENOSIS OF LUMBAR REGION, UNSPECIFIED WHETHER NEUROGENIC CLAUDICATION PRESENT: Primary | ICD-10-CM

## 2023-04-11 RX ORDER — GABAPENTIN 300 MG/1
CAPSULE ORAL
Qty: 90 CAPSULE | Refills: 1 | Status: SHIPPED | OUTPATIENT
Start: 2023-04-11 | End: 2023-07-05

## 2023-07-25 ENCOUNTER — OFFICE VISIT (OUTPATIENT)
Age: 78
End: 2023-07-25
Payer: MEDICARE

## 2023-07-25 VITALS
HEIGHT: 69 IN | HEART RATE: 80 BPM | RESPIRATION RATE: 18 BRPM | BODY MASS INDEX: 32.58 KG/M2 | SYSTOLIC BLOOD PRESSURE: 132 MMHG | DIASTOLIC BLOOD PRESSURE: 71 MMHG | OXYGEN SATURATION: 94 % | TEMPERATURE: 98 F | WEIGHT: 220 LBS

## 2023-07-25 DIAGNOSIS — M54.50 CHRONIC LOW BACK PAIN, UNSPECIFIED BACK PAIN LATERALITY, UNSPECIFIED WHETHER SCIATICA PRESENT: ICD-10-CM

## 2023-07-25 DIAGNOSIS — M25.422 ELBOW SWELLING, LEFT: Primary | ICD-10-CM

## 2023-07-25 DIAGNOSIS — G89.29 CHRONIC LOW BACK PAIN, UNSPECIFIED BACK PAIN LATERALITY, UNSPECIFIED WHETHER SCIATICA PRESENT: ICD-10-CM

## 2023-07-25 PROCEDURE — 99211 OFF/OP EST MAY X REQ PHY/QHP: CPT | Performed by: INTERNAL MEDICINE

## 2023-07-25 PROCEDURE — 1036F TOBACCO NON-USER: CPT | Performed by: INTERNAL MEDICINE

## 2023-07-25 PROCEDURE — 99213 OFFICE O/P EST LOW 20 MIN: CPT | Performed by: INTERNAL MEDICINE

## 2023-07-25 PROCEDURE — G8417 CALC BMI ABV UP PARAM F/U: HCPCS | Performed by: INTERNAL MEDICINE

## 2023-07-25 PROCEDURE — 3078F DIAST BP <80 MM HG: CPT | Performed by: INTERNAL MEDICINE

## 2023-07-25 PROCEDURE — G8427 DOCREV CUR MEDS BY ELIG CLIN: HCPCS | Performed by: INTERNAL MEDICINE

## 2023-07-25 PROCEDURE — 1123F ACP DISCUSS/DSCN MKR DOCD: CPT | Performed by: INTERNAL MEDICINE

## 2023-07-25 PROCEDURE — 3074F SYST BP LT 130 MM HG: CPT | Performed by: INTERNAL MEDICINE

## 2023-07-25 ASSESSMENT — PATIENT HEALTH QUESTIONNAIRE - PHQ9
SUM OF ALL RESPONSES TO PHQ9 QUESTIONS 1 & 2: 0
SUM OF ALL RESPONSES TO PHQ QUESTIONS 1-9: 0
1. LITTLE INTEREST OR PLEASURE IN DOING THINGS: 0
SUM OF ALL RESPONSES TO PHQ QUESTIONS 1-9: 0
SUM OF ALL RESPONSES TO PHQ QUESTIONS 1-9: 0
2. FEELING DOWN, DEPRESSED OR HOPELESS: 0
SUM OF ALL RESPONSES TO PHQ QUESTIONS 1-9: 0

## 2023-07-25 NOTE — PROGRESS NOTES
Bryan Anderson presents today for Bursitis left elbow              1. \"Have you been to the ER, urgent care clinic since your last visit? Hospitalized since your last visit? \" no    2. \"Have you seen or consulted any other health care providers outside of the [address] [place] [address] since your last visit? \" no     3. For patients aged 43-73: Has the patient had a colonoscopy / FIT/ Cologuard? NA - based on age      If the patient is female:    4. For patients aged 43-66: Has the patient had a mammogram within the past 2 years? NA - based on age or sex      11. For patients aged 21-65: Has the patient had a pap smear?  NA - based on age or sex

## 2023-07-26 ASSESSMENT — ENCOUNTER SYMPTOMS
SHORTNESS OF BREATH: 0
BLOOD IN STOOL: 0
BACK PAIN: 1

## 2023-07-26 NOTE — PROGRESS NOTES
PRESLEY Ribeiro is here for recurrent swelling of elbow, left olecranon bursa. I aspirated the area about 3 months ago, obtained blood-tinged fluid, not cloudy. He denies remembering any trauma to the area, says the area is very tender if he accidentally bumps it, or tries to rest his elbow on a hard surface. He and his wife are still enjoying living at [facility]. He no longer rides his bike due to balance problems. He is still attending therapy for his back, and reports he had recent relief after dry needling and application of a TENS unit. No new family history reported. Past Medical History:   Diagnosis Date    GERD (gastroesophageal reflux disease)     HLD (hyperlipidemia)     Hypertension     Tubular adenoma of colon         Past Surgical History:   Procedure Laterality Date    CHOLECYSTECTOMY, LAPAROSCOPIC      NEUROLOGICAL SURGERY      cyst removed from lower back above tail bone    TOTAL KNEE ARTHROPLASTY Left         Current Outpatient Medications   Medication Sig Dispense Refill    traMADol (ULTRAM) 50 MG tablet Take 1 tablet by mouth every 6 hours as needed for Pain. rosuvastatin (CRESTOR) 10 MG tablet Take 1 tablet by mouth daily 90 tablet 3    acetaminophen (TYLENOL) 650 MG extended release tablet Take 1 tablet by mouth in the morning and 1 tablet at noon and 1 tablet in the evening. Cholecalciferol 50 MCG (2000 UT) CAPS Take by mouth daily      cyanocobalamin 1000 MCG tablet Take 2.5 tablets by mouth daily      ibuprofen (ADVIL;MOTRIN) 200 MG tablet Take by mouth      losartan (COZAAR) 50 MG tablet TAKE 2 TABLETS DAILY      pantoprazole (PROTONIX) 40 MG tablet Take 1 tablet by mouth daily      gabapentin (NEURONTIN) 300 MG capsule 1 po q HS 90 capsule 1     No current facility-administered medications for this visit.         No Known Allergies     Social History     Socioeconomic History    Marital status:      Spouse name: Not on file    Number of children: Not on

## 2023-07-28 RX ORDER — PANTOPRAZOLE SODIUM 40 MG/1
40 TABLET, DELAYED RELEASE ORAL DAILY
Qty: 30 TABLET | Refills: 0 | Status: SHIPPED | OUTPATIENT
Start: 2023-07-28

## 2023-10-19 ENCOUNTER — OFFICE VISIT (OUTPATIENT)
Age: 78
End: 2023-10-19
Payer: MEDICARE

## 2023-10-19 VITALS
TEMPERATURE: 98 F | BODY MASS INDEX: 31.1 KG/M2 | SYSTOLIC BLOOD PRESSURE: 127 MMHG | DIASTOLIC BLOOD PRESSURE: 76 MMHG | HEART RATE: 76 BPM | RESPIRATION RATE: 18 BRPM | HEIGHT: 69 IN | OXYGEN SATURATION: 99 % | WEIGHT: 210 LBS

## 2023-10-19 DIAGNOSIS — S81.812A LACERATION OF LEFT LOWER EXTREMITY, INITIAL ENCOUNTER: ICD-10-CM

## 2023-10-19 DIAGNOSIS — Z23 ENCOUNTER FOR IMMUNIZATION: ICD-10-CM

## 2023-10-19 DIAGNOSIS — R42 DISEQUILIBRIUM: ICD-10-CM

## 2023-10-19 DIAGNOSIS — E78.00 PURE HYPERCHOLESTEROLEMIA, UNSPECIFIED: ICD-10-CM

## 2023-10-19 DIAGNOSIS — I10 ESSENTIAL (PRIMARY) HYPERTENSION: ICD-10-CM

## 2023-10-19 DIAGNOSIS — Z00.00 MEDICARE ANNUAL WELLNESS VISIT, SUBSEQUENT: Primary | ICD-10-CM

## 2023-10-19 PROCEDURE — 3078F DIAST BP <80 MM HG: CPT | Performed by: INTERNAL MEDICINE

## 2023-10-19 PROCEDURE — PBSHW TDAP, BOOSTRIX, (AGE 10 YRS+), IM: Performed by: INTERNAL MEDICINE

## 2023-10-19 PROCEDURE — G8427 DOCREV CUR MEDS BY ELIG CLIN: HCPCS | Performed by: INTERNAL MEDICINE

## 2023-10-19 PROCEDURE — 99215 OFFICE O/P EST HI 40 MIN: CPT | Performed by: INTERNAL MEDICINE

## 2023-10-19 PROCEDURE — 3074F SYST BP LT 130 MM HG: CPT | Performed by: INTERNAL MEDICINE

## 2023-10-19 PROCEDURE — 90694 VACC AIIV4 NO PRSRV 0.5ML IM: CPT | Performed by: INTERNAL MEDICINE

## 2023-10-19 PROCEDURE — 90715 TDAP VACCINE 7 YRS/> IM: CPT | Performed by: INTERNAL MEDICINE

## 2023-10-19 PROCEDURE — 1123F ACP DISCUSS/DSCN MKR DOCD: CPT | Performed by: INTERNAL MEDICINE

## 2023-10-19 PROCEDURE — 1036F TOBACCO NON-USER: CPT | Performed by: INTERNAL MEDICINE

## 2023-10-19 PROCEDURE — G8417 CALC BMI ABV UP PARAM F/U: HCPCS | Performed by: INTERNAL MEDICINE

## 2023-10-19 PROCEDURE — G8484 FLU IMMUNIZE NO ADMIN: HCPCS | Performed by: INTERNAL MEDICINE

## 2023-10-19 PROCEDURE — PBSHW INFLUENZA, FLUAD, (AGE 65 Y+), IM, PF, 0.5 ML: Performed by: INTERNAL MEDICINE

## 2023-10-19 RX ORDER — ROSUVASTATIN CALCIUM 10 MG/1
10 TABLET, COATED ORAL DAILY
Qty: 90 TABLET | Refills: 3 | Status: SHIPPED | OUTPATIENT
Start: 2023-10-19

## 2023-10-19 ASSESSMENT — PATIENT HEALTH QUESTIONNAIRE - PHQ9
1. LITTLE INTEREST OR PLEASURE IN DOING THINGS: 0
SUM OF ALL RESPONSES TO PHQ QUESTIONS 1-9: 0
SUM OF ALL RESPONSES TO PHQ9 QUESTIONS 1 & 2: 0
2. FEELING DOWN, DEPRESSED OR HOPELESS: 0

## 2023-10-19 ASSESSMENT — LIFESTYLE VARIABLES
HOW MANY STANDARD DRINKS CONTAINING ALCOHOL DO YOU HAVE ON A TYPICAL DAY: 1 OR 2
HOW OFTEN DO YOU HAVE A DRINK CONTAINING ALCOHOL: MONTHLY OR LESS

## 2023-10-19 ASSESSMENT — ENCOUNTER SYMPTOMS
SHORTNESS OF BREATH: 0
BLOOD IN STOOL: 0
BACK PAIN: 1

## 2023-10-19 NOTE — ACP (ADVANCE CARE PLANNING)
Advance Care Planning     Advance Care Planning (ACP) Physician/NP/PA Conversation    Date of Conversation: 10/19/2023  Conducted with: Patient with Decision Making Capacity    Healthcare Decision Maker:      Primary Decision Maker: Ariadna Peterson - Spouse - [phone]    Click here to complete Healthcare Decision Makers including selection of the Healthcare Decision Maker Relationship (ie \"Primary\")      Care Preferences:    Hospitalization: \"If your health worsens and it becomes clear that your chance of recovery is unlikely, what would be your preference regarding hospitalization? \"  The patient is unsure. Ventilation: \"If you were unable to breath on your own and your chance of recovery was unlikely, what would be your preference about the use of a ventilator (breathing machine) if it was available to you? \"  The patient would desire the use of a ventilator. Resuscitation: \"In the event your heart stopped as a result of an underlying serious health condition, would you want attempts made to restart your heart, or would you prefer a natural death? \"  Yes, attempt to resuscitate.     artificial nutrition no feeding tubes    Conversation Outcomes / Follow-Up Plan:  ACP in process - information provided, considering goals and options  Reviewed DNR/DNI and patient elects Full Code (Attempt Resuscitation)    Length of Voluntary ACP Conversation in minutes:  16 minutes    Oliver Sanchez MD

## 2023-10-19 NOTE — PROGRESS NOTES
PRESLEY     Chrissy Gillespie is here with concerns about his worsening disequilibrium. He does have neuropathy, had an evaluation by neurology. His feet are numb. He has not fallen. Reviewed that his disequilibrium is likely due to to his neuropathy, with altered proprioception when placing his feet reminded him to always look at his feet when he walks, continue to use assistive cane, always have lights on, and avoid tripping hazards such as electrical cords and throw rugs. He is very much enjoying living at [facility]. I noted his weight has decreased 10 pounds since he was last here. He denies malaise or dysgeusia, just does not have the appetite he used to. Past Medical History:   Diagnosis Date    GERD (gastroesophageal reflux disease)     HLD (hyperlipidemia)     Hypertension     Tubular adenoma of colon         Past Surgical History:   Procedure Laterality Date    CHOLECYSTECTOMY, LAPAROSCOPIC      NEUROLOGICAL SURGERY      cyst removed from lower back above tail bone    TOTAL KNEE ARTHROPLASTY Left         Current Outpatient Medications   Medication Sig Dispense Refill    rosuvastatin (CRESTOR) 10 MG tablet Take 1 tablet by mouth daily 90 tablet 3    pantoprazole (PROTONIX) 40 MG tablet Take 1 tablet by mouth daily 30 tablet 0    traMADol (ULTRAM) 50 MG tablet Take 1 tablet by mouth every 6 hours as needed for Pain. acetaminophen (TYLENOL) 650 MG extended release tablet Take 1 tablet by mouth in the morning and 1 tablet at noon and 1 tablet in the evening. Cholecalciferol 50 MCG (2000 UT) CAPS Take by mouth daily      cyanocobalamin 1000 MCG tablet Take 2.5 tablets by mouth daily      ibuprofen (ADVIL;MOTRIN) 200 MG tablet Take by mouth      losartan (COZAAR) 50 MG tablet TAKE 2 TABLETS DAILY      gabapentin (NEURONTIN) 300 MG capsule 1 po q HS 90 capsule 1     No current facility-administered medications for this visit.         No Known Allergies     Social History     Socioeconomic History

## 2023-10-27 ENCOUNTER — TELEPHONE (OUTPATIENT)
Age: 78
End: 2023-10-27

## 2023-10-27 DIAGNOSIS — M48.061 SPINAL STENOSIS OF LUMBAR REGION, UNSPECIFIED WHETHER NEUROGENIC CLAUDICATION PRESENT: ICD-10-CM

## 2023-10-27 RX ORDER — GABAPENTIN 300 MG/1
CAPSULE ORAL
Qty: 90 CAPSULE | Refills: 1 | Status: SHIPPED | OUTPATIENT
Start: 2023-10-27 | End: 2024-01-20

## 2023-10-27 NOTE — TELEPHONE ENCOUNTER
Refill request received via fax:   - gabapentin (NEURONTIN) 300 MG capsule    Pharmacy: [address] [place] [address]

## 2023-12-14 ENCOUNTER — TELEPHONE (OUTPATIENT)
Age: 78
End: 2023-12-14

## 2023-12-14 DIAGNOSIS — I10 ESSENTIAL (PRIMARY) HYPERTENSION: Primary | ICD-10-CM

## 2023-12-14 RX ORDER — LOSARTAN POTASSIUM 50 MG/1
100 TABLET ORAL DAILY
Qty: 180 TABLET | Refills: 3 | Status: SHIPPED | OUTPATIENT
Start: 2023-12-14

## 2024-04-11 ENCOUNTER — HOSPITAL ENCOUNTER (OUTPATIENT)
Facility: HOSPITAL | Age: 79
Setting detail: SPECIMEN
Discharge: HOME | End: 2024-04-11
Payer: MEDICARE

## 2024-04-11 DIAGNOSIS — I10 ESSENTIAL (PRIMARY) HYPERTENSION: ICD-10-CM

## 2024-04-11 DIAGNOSIS — E78.00 PURE HYPERCHOLESTEROLEMIA, UNSPECIFIED: ICD-10-CM

## 2024-04-11 LAB
ALBUMIN SERPL-MCNC: 3.8 G/DL (ref 3.4–5)
ALBUMIN/GLOB SERPL: 1.2 (ref 0.8–1.7)
ALP SERPL-CCNC: 83 U/L (ref 45–117)
ALT SERPL-CCNC: 28 U/L (ref 16–61)
ANION GAP SERPL CALC-SCNC: 5 MMOL/L (ref 3–18)
APPEARANCE UR: CLEAR
AST SERPL-CCNC: 24 U/L (ref 10–38)
BACTERIA URNS QL MICRO: NEGATIVE /HPF
BILIRUB SERPL-MCNC: 1 MG/DL (ref 0.2–1)
BILIRUB UR QL: NEGATIVE
BUN SERPL-MCNC: 11 MG/DL (ref 7–18)
BUN/CREAT SERPL: 11 (ref 12–20)
CALCIUM SERPL-MCNC: 8.7 MG/DL (ref 8.5–10.1)
CHLORIDE SERPL-SCNC: 107 MMOL/L (ref 100–111)
CHOLEST SERPL-MCNC: 194 MG/DL
CO2 SERPL-SCNC: 28 MMOL/L (ref 21–32)
COLOR UR: YELLOW
CREAT SERPL-MCNC: 1.04 MG/DL (ref 0.6–1.3)
EPITH CASTS URNS QL MICRO: ABNORMAL /LPF (ref 0–5)
GLOBULIN SER CALC-MCNC: 3.2 G/DL (ref 2–4)
GLUCOSE SERPL-MCNC: 94 MG/DL (ref 74–99)
GLUCOSE UR STRIP.AUTO-MCNC: NEGATIVE MG/DL
HDLC SERPL-MCNC: 123 MG/DL (ref 40–60)
HDLC SERPL: 1.6 (ref 0–5)
HGB UR QL STRIP: ABNORMAL
KETONES UR QL STRIP.AUTO: NEGATIVE MG/DL
LDLC SERPL CALC-MCNC: 58.2 MG/DL (ref 0–100)
LEUKOCYTE ESTERASE UR QL STRIP.AUTO: NEGATIVE
LIPID PANEL: ABNORMAL
NITRITE UR QL STRIP.AUTO: NEGATIVE
PH UR STRIP: 7 (ref 5–8)
POTASSIUM SERPL-SCNC: 4.2 MMOL/L (ref 3.5–5.5)
PROT SERPL-MCNC: 7 G/DL (ref 6.4–8.2)
PROT UR STRIP-MCNC: NEGATIVE MG/DL
RBC #/AREA URNS HPF: ABNORMAL /HPF (ref 0–5)
SODIUM SERPL-SCNC: 140 MMOL/L (ref 136–145)
SP GR UR REFRACTOMETRY: 1.01 (ref 1–1.03)
TRIGL SERPL-MCNC: 64 MG/DL
UROBILINOGEN UR QL STRIP.AUTO: 1 EU/DL (ref 0.2–1)
VLDLC SERPL CALC-MCNC: 12.8 MG/DL
WBC URNS QL MICRO: ABNORMAL /HPF (ref 0–4)

## 2024-04-11 PROCEDURE — 81001 URINALYSIS AUTO W/SCOPE: CPT

## 2024-04-11 PROCEDURE — 80053 COMPREHEN METABOLIC PANEL: CPT

## 2024-04-11 PROCEDURE — 36415 COLL VENOUS BLD VENIPUNCTURE: CPT

## 2024-04-11 PROCEDURE — 80061 LIPID PANEL: CPT

## 2024-04-17 NOTE — PROGRESS NOTES
Yobani Peterson presents today for chronic condition.              1. \"Have you been to the ER, urgent care clinic since your last visit?  Hospitalized since your last visit?\" no    2. \"Have you seen or consulted any other health care providers outside of the [facility] since your last visit?\" no     3. For patients aged 45-75: Has the patient had a colonoscopy / FIT/ Cologuard? NA - based on age      If the patient is female:    4. For patients aged 40-74: Has the patient had a mammogram within the past 2 years? NA - based on age or sex      5. For patients aged 21-65: Has the patient had a pap smear? NA - based on age or sex

## 2024-04-18 ENCOUNTER — OFFICE VISIT (OUTPATIENT)
Age: 79
End: 2024-04-18
Payer: MEDICARE

## 2024-04-18 VITALS
BODY MASS INDEX: 31.99 KG/M2 | OXYGEN SATURATION: 96 % | WEIGHT: 216 LBS | DIASTOLIC BLOOD PRESSURE: 74 MMHG | HEIGHT: 69 IN | SYSTOLIC BLOOD PRESSURE: 133 MMHG | RESPIRATION RATE: 18 BRPM | HEART RATE: 88 BPM | TEMPERATURE: 98.2 F

## 2024-04-18 DIAGNOSIS — R42 DISEQUILIBRIUM: ICD-10-CM

## 2024-04-18 DIAGNOSIS — I10 ESSENTIAL HYPERTENSION: Primary | ICD-10-CM

## 2024-04-18 PROCEDURE — 1123F ACP DISCUSS/DSCN MKR DOCD: CPT | Performed by: INTERNAL MEDICINE

## 2024-04-18 PROCEDURE — G8417 CALC BMI ABV UP PARAM F/U: HCPCS | Performed by: INTERNAL MEDICINE

## 2024-04-18 PROCEDURE — G8427 DOCREV CUR MEDS BY ELIG CLIN: HCPCS | Performed by: INTERNAL MEDICINE

## 2024-04-18 PROCEDURE — 99213 OFFICE O/P EST LOW 20 MIN: CPT | Performed by: INTERNAL MEDICINE

## 2024-04-18 PROCEDURE — 3078F DIAST BP <80 MM HG: CPT | Performed by: INTERNAL MEDICINE

## 2024-04-18 PROCEDURE — 3075F SYST BP GE 130 - 139MM HG: CPT | Performed by: INTERNAL MEDICINE

## 2024-04-18 PROCEDURE — 1036F TOBACCO NON-USER: CPT | Performed by: INTERNAL MEDICINE

## 2024-04-18 ASSESSMENT — PATIENT HEALTH QUESTIONNAIRE - PHQ9
1. LITTLE INTEREST OR PLEASURE IN DOING THINGS: NOT AT ALL
SUM OF ALL RESPONSES TO PHQ QUESTIONS 1-9: 0
SUM OF ALL RESPONSES TO PHQ9 QUESTIONS 1 & 2: 0
SUM OF ALL RESPONSES TO PHQ QUESTIONS 1-9: 0
2. FEELING DOWN, DEPRESSED OR HOPELESS: NOT AT ALL
SUM OF ALL RESPONSES TO PHQ QUESTIONS 1-9: 0
SUM OF ALL RESPONSES TO PHQ QUESTIONS 1-9: 0

## 2024-04-18 ASSESSMENT — ENCOUNTER SYMPTOMS
BLOOD IN STOOL: 0
BACK PAIN: 1
SHORTNESS OF BREATH: 0

## 2024-04-19 NOTE — PROGRESS NOTES
HPI     Yobani Peterson is here for a routine check and complains of worsening balance when walking.  There is a [facility] near his home, and would like to be referred for gait/balance training.    On questioning, still has some back pain, not severe, takes Tylenol if needs.    No new family history reported.  No recent falls.    He sees a neurologist every 6 months    Microhematuria for decades, no gross hematuria, evaluated with imaging and cystoscopies, negative urine cytology        Past Medical History:   Diagnosis Date    GERD (gastroesophageal reflux disease)     HLD (hyperlipidemia)     Hypertension     Tubular adenoma of colon         Past Surgical History:   Procedure Laterality Date    CHOLECYSTECTOMY, LAPAROSCOPIC      NEUROLOGICAL SURGERY      cyst removed from lower back above tail bone    TOTAL KNEE ARTHROPLASTY Left         Current Outpatient Medications   Medication Sig Dispense Refill    gabapentin (NEURONTIN) 300 MG capsule 1 po q HS 90 capsule 1    losartan (COZAAR) 50 MG tablet Take 2 tablets by mouth daily 180 tablet 3    rosuvastatin (CRESTOR) 10 MG tablet Take 1 tablet by mouth daily 90 tablet 3    pantoprazole (PROTONIX) 40 MG tablet Take 1 tablet by mouth daily 30 tablet 0    traMADol (ULTRAM) 50 MG tablet Take 1 tablet by mouth every 6 hours as needed for Pain.      acetaminophen (TYLENOL) 650 MG extended release tablet Take 1 tablet by mouth in the morning and 1 tablet at noon and 1 tablet in the evening.      Cholecalciferol 50 MCG (2000 UT) CAPS Take by mouth daily      cyanocobalamin 1000 MCG tablet Take 2.5 tablets by mouth daily      ibuprofen (ADVIL;MOTRIN) 200 MG tablet Take by mouth       No current facility-administered medications for this visit.        No Known Allergies     Social History     Socioeconomic History    Marital status:      Spouse name: Not on file    Number of children: Not on file    Years of education: Not on file    Highest education level: Not

## 2024-09-11 ENCOUNTER — TELEPHONE (OUTPATIENT)
Facility: CLINIC | Age: 79
End: 2024-09-11

## 2024-09-20 NOTE — TELEPHONE ENCOUNTER
----- Message from Monique Childress sent at 9/20/2024 12:27 PM CDT -----  Contact: Patient  Type:  Needs Medical Advice    Who Called: Patient    Would the patient rather a call back or a response via [facility]? Call    Best Call Back Number: [phone] (home)     Additional Information: Patient needs to know where to drop off her kidney stone. Please call to advise of orders.   error

## 2024-10-03 DIAGNOSIS — K21.9 GASTROESOPHAGEAL REFLUX DISEASE, UNSPECIFIED WHETHER ESOPHAGITIS PRESENT: Primary | ICD-10-CM

## 2024-10-03 RX ORDER — PANTOPRAZOLE SODIUM 40 MG/1
40 TABLET, DELAYED RELEASE ORAL DAILY
Qty: 90 TABLET | Refills: 2 | Status: SHIPPED | OUTPATIENT
Start: 2024-10-03

## 2024-10-04 RX ORDER — PANTOPRAZOLE SODIUM 40 MG/1
TABLET, DELAYED RELEASE ORAL
Refills: 0 | OUTPATIENT
Start: 2024-10-04

## 2024-10-08 NOTE — PROGRESS NOTES
Yobani Peterson presents today for chronic condition.        \"Have you been to the ER, urgent care clinic since your last visit?  Hospitalized since your last visit?\"    NO    “Have you seen or consulted any other health care providers outside of [facility] since your last visit?”    NO       Yobani Peterson 1945 male who presents for routine immunizations.   Patient denies any symptoms , reactions or allergies that would exclude them from being immunized today.  Risks and adverse reactions were discussed and the VIS was given to them. All questions were addressed.  Order placed for Flu (high dose),  per Verbal Order from Erica Malik with read back.  There were no reactions observed.    Daniel Pineda

## 2024-10-14 DIAGNOSIS — E78.00 PURE HYPERCHOLESTEROLEMIA, UNSPECIFIED: ICD-10-CM

## 2024-10-14 RX ORDER — ROSUVASTATIN CALCIUM 10 MG/1
10 TABLET, COATED ORAL DAILY
Qty: 90 TABLET | Refills: 2 | Status: SHIPPED | OUTPATIENT
Start: 2024-10-14

## 2024-10-17 ENCOUNTER — HOSPITAL ENCOUNTER (OUTPATIENT)
Facility: HOSPITAL | Age: 79
Setting detail: SPECIMEN
Discharge: HOME | End: 2024-10-20
Payer: MEDICARE

## 2024-10-17 ENCOUNTER — OFFICE VISIT (OUTPATIENT)
Facility: CLINIC | Age: 79
End: 2024-10-17

## 2024-10-17 DIAGNOSIS — E53.8 B12 DEFICIENCY: ICD-10-CM

## 2024-10-17 DIAGNOSIS — R42 DISEQUILIBRIUM: Primary | ICD-10-CM

## 2024-10-17 DIAGNOSIS — R41.3 MEMORY LOSS: ICD-10-CM

## 2024-10-17 DIAGNOSIS — Z23 NEED FOR VACCINATION: ICD-10-CM

## 2024-10-17 DIAGNOSIS — I10 ESSENTIAL HYPERTENSION: ICD-10-CM

## 2024-10-17 LAB — VIT B12 SERPL-MCNC: 384 PG/ML (ref 211–911)

## 2024-10-17 PROCEDURE — 83921 ORGANIC ACID SINGLE QUANT: CPT

## 2024-10-17 PROCEDURE — 82607 VITAMIN B-12: CPT

## 2024-10-17 PROCEDURE — 36415 COLL VENOUS BLD VENIPUNCTURE: CPT

## 2024-10-17 RX ORDER — DONEPEZIL HYDROCHLORIDE 5 MG/1
5 TABLET, FILM COATED ORAL NIGHTLY
Qty: 90 TABLET | Refills: 0 | Status: SHIPPED | OUTPATIENT
Start: 2024-10-17

## 2024-10-17 SDOH — ECONOMIC STABILITY: FOOD INSECURITY: WITHIN THE PAST 12 MONTHS, THE FOOD YOU BOUGHT JUST DIDN'T LAST AND YOU DIDN'T HAVE MONEY TO GET MORE.: NEVER TRUE

## 2024-10-17 SDOH — ECONOMIC STABILITY: FOOD INSECURITY: WITHIN THE PAST 12 MONTHS, YOU WORRIED THAT YOUR FOOD WOULD RUN OUT BEFORE YOU GOT MONEY TO BUY MORE.: NEVER TRUE

## 2024-10-17 SDOH — ECONOMIC STABILITY: INCOME INSECURITY: HOW HARD IS IT FOR YOU TO PAY FOR THE VERY BASICS LIKE FOOD, HOUSING, MEDICAL CARE, AND HEATING?: NOT HARD AT ALL

## 2024-10-17 ASSESSMENT — PATIENT HEALTH QUESTIONNAIRE - PHQ9
SUM OF ALL RESPONSES TO PHQ QUESTIONS 1-9: 0
1. LITTLE INTEREST OR PLEASURE IN DOING THINGS: NOT AT ALL
SUM OF ALL RESPONSES TO PHQ QUESTIONS 1-9: 0
SUM OF ALL RESPONSES TO PHQ9 QUESTIONS 1 & 2: 0
2. FEELING DOWN, DEPRESSED OR HOPELESS: NOT AT ALL

## 2024-10-19 VITALS
WEIGHT: 218 LBS | TEMPERATURE: 98.2 F | BODY MASS INDEX: 32.29 KG/M2 | HEIGHT: 69 IN | SYSTOLIC BLOOD PRESSURE: 133 MMHG | RESPIRATION RATE: 18 BRPM | HEART RATE: 68 BPM | OXYGEN SATURATION: 99 % | DIASTOLIC BLOOD PRESSURE: 75 MMHG

## 2024-10-19 PROBLEM — E53.8 B12 DEFICIENCY: Status: ACTIVE | Noted: 2024-10-19

## 2024-10-19 PROBLEM — R42 DISEQUILIBRIUM: Status: ACTIVE | Noted: 2024-10-19

## 2024-10-19 ASSESSMENT — ENCOUNTER SYMPTOMS
ABDOMINAL PAIN: 0
SHORTNESS OF BREATH: 0
BLOOD IN STOOL: 0

## 2024-10-19 NOTE — PROGRESS NOTES
HPI     Yobani Peterson is here with complaints of worsening memory and technical skills-- reports his wife has noticed as .    Worsening problems with balance, due to his neuropathy, per his neurologist.  He uses a cane.  Reminded to turn on lights at night, watch for he is placing his feet.    He would like to try something for his memory, and rather than something over-the-counter, I will give him a trial of low-dose Aricept to see if it helps.    Past Medical History:   Diagnosis Date    GERD (gastroesophageal reflux disease)     HLD (hyperlipidemia)     Hypertension     Tubular adenoma of colon         Past Surgical History:   Procedure Laterality Date    CHOLECYSTECTOMY, LAPAROSCOPIC      NEUROLOGICAL SURGERY      cyst removed from lower back above tail bone    TOTAL KNEE ARTHROPLASTY Left         Current Outpatient Medications   Medication Sig Dispense Refill    donepezil (ARICEPT) 5 MG tablet Take 1 tablet by mouth nightly 90 tablet 0    rosuvastatin (CRESTOR) 10 MG tablet Take 1 tablet by mouth daily 90 tablet 2    pantoprazole (PROTONIX) 40 MG tablet Take 1 tablet by mouth daily 90 tablet 2    gabapentin (NEURONTIN) 300 MG capsule 1 po q HS 90 capsule 1    losartan (COZAAR) 50 MG tablet Take 2 tablets by mouth daily 180 tablet 3    acetaminophen (TYLENOL) 650 MG extended release tablet Take 1 tablet by mouth in the morning and 1 tablet at noon and 1 tablet in the evening.      Cholecalciferol 50 MCG (2000 UT) CAPS Take by mouth daily      cyanocobalamin 1000 MCG tablet Take 2.5 tablets by mouth daily      ibuprofen (ADVIL;MOTRIN) 200 MG tablet Take by mouth      traMADol (ULTRAM) 50 MG tablet Take 1 tablet by mouth every 6 hours as needed for Pain. (Patient not taking: Reported on 10/17/2024)       No current facility-administered medications for this visit.        No Known Allergies     Social History     Socioeconomic History    Marital status:      Spouse name: Not on file    Number of children: Not

## 2024-10-23 LAB — METHYLMALONATE SERPL-SCNC: 229 NMOL/L (ref 0–378)

## 2024-12-16 ENCOUNTER — TELEPHONE (OUTPATIENT)
Facility: CLINIC | Age: 79
End: 2024-12-16

## 2024-12-16 DIAGNOSIS — I10 ESSENTIAL (PRIMARY) HYPERTENSION: ICD-10-CM

## 2024-12-16 RX ORDER — LOSARTAN POTASSIUM 50 MG/1
100 TABLET ORAL DAILY
Qty: 180 TABLET | Refills: 0 | Status: SHIPPED | OUTPATIENT
Start: 2024-12-16 | End: 2024-12-16

## 2024-12-16 RX ORDER — LOSARTAN POTASSIUM 50 MG/1
100 TABLET ORAL DAILY
Qty: 180 TABLET | Refills: 0 | Status: SHIPPED | OUTPATIENT
Start: 2024-12-16

## 2024-12-16 NOTE — TELEPHONE ENCOUNTER
Provider's note given to patient and a list of providers and clinics taking patients.      Hello,     We have learned on November 22, 2024 that Dr. Malik is retiring effective immediately.   Our providers here at [facility] are not able to accommodate Dr. Malik's patients long term.   We are notifying patients  so you can find a new provider. You will receive a formal letter in the near future.   Please understand that we will provide you with needed medications for 90 days. We have providers in our medical group that are taking new patients located at [facility] [phone] and [facility] [phone].  If you would like to stay within [facility] you may contact one of those offices to schedule a new patient appointment.  Many of the providers can offer same day new patient appointments.  We apologize for any inconvenience this may cause.  Please reach out if you have any questions.

## 2024-12-16 NOTE — TELEPHONE ENCOUNTER
Refill request via fax     Medication:   losartan (COZAAR) 50 MG tablet   Quantity: 180  Pharmacy: [facility] - [address]   Last Fill: 12/14/2023    PCP: Erica Malik MD    LAST OFFICE VISIT: 10/17/2024      Future Appointments   Date Time Provider Department Center   1/17/2025  1:20 PM Erica Malik MD [facility]       Left voicemail to cancel appointment, discuss refill and provider's USP

## 2024-12-16 NOTE — TELEPHONE ENCOUNTER
BP Readings from Last 3 Encounters:   10/19/24 133/75   04/18/24 133/74   10/19/23 127/76     Lab Results   Component Value Date/Time     04/11/2024 09:43 AM    K 4.2 04/11/2024 09:43 AM     04/11/2024 09:43 AM    CO2 28 04/11/2024 09:43 AM    BUN 11 04/11/2024 09:43 AM    CREATININE 1.04 04/11/2024 09:43 AM    GLUCOSE 94 04/11/2024 09:43 AM    CALCIUM 8.7 04/11/2024 09:43 AM    LABGLOM 73 04/11/2024 09:43 AM    LABGLOM >60 10/21/2022 11:00 AM

## 2025-01-27 ENCOUNTER — OFFICE VISIT (OUTPATIENT)
Facility: CLINIC | Age: 80
End: 2025-01-27
Payer: MEDICARE

## 2025-01-27 VITALS
WEIGHT: 216.8 LBS | SYSTOLIC BLOOD PRESSURE: 147 MMHG | BODY MASS INDEX: 32.86 KG/M2 | DIASTOLIC BLOOD PRESSURE: 83 MMHG | OXYGEN SATURATION: 99 % | RESPIRATION RATE: 20 BRPM | HEIGHT: 68 IN | HEART RATE: 75 BPM | TEMPERATURE: 98.1 F

## 2025-01-27 DIAGNOSIS — I10 ESSENTIAL (PRIMARY) HYPERTENSION: ICD-10-CM

## 2025-01-27 PROCEDURE — 1159F MED LIST DOCD IN RCRD: CPT | Performed by: FAMILY MEDICINE

## 2025-01-27 PROCEDURE — G8427 DOCREV CUR MEDS BY ELIG CLIN: HCPCS | Performed by: FAMILY MEDICINE

## 2025-01-27 PROCEDURE — 99204 OFFICE O/P NEW MOD 45 MIN: CPT | Performed by: FAMILY MEDICINE

## 2025-01-27 PROCEDURE — G8417 CALC BMI ABV UP PARAM F/U: HCPCS | Performed by: FAMILY MEDICINE

## 2025-01-27 PROCEDURE — 3079F DIAST BP 80-89 MM HG: CPT | Performed by: FAMILY MEDICINE

## 2025-01-27 PROCEDURE — 1036F TOBACCO NON-USER: CPT | Performed by: FAMILY MEDICINE

## 2025-01-27 PROCEDURE — 3077F SYST BP >= 140 MM HG: CPT | Performed by: FAMILY MEDICINE

## 2025-01-27 PROCEDURE — 1123F ACP DISCUSS/DSCN MKR DOCD: CPT | Performed by: FAMILY MEDICINE

## 2025-01-27 RX ORDER — LOSARTAN POTASSIUM 100 MG/1
100 TABLET ORAL DAILY
Qty: 90 TABLET | Refills: 1 | Status: SHIPPED | OUTPATIENT
Start: 2025-01-27

## 2025-01-27 SDOH — ECONOMIC STABILITY: FOOD INSECURITY: WITHIN THE PAST 12 MONTHS, THE FOOD YOU BOUGHT JUST DIDN'T LAST AND YOU DIDN'T HAVE MONEY TO GET MORE.: NEVER TRUE

## 2025-01-27 SDOH — ECONOMIC STABILITY: FOOD INSECURITY: WITHIN THE PAST 12 MONTHS, YOU WORRIED THAT YOUR FOOD WOULD RUN OUT BEFORE YOU GOT MONEY TO BUY MORE.: NEVER TRUE

## 2025-01-27 ASSESSMENT — PATIENT HEALTH QUESTIONNAIRE - PHQ9
SUM OF ALL RESPONSES TO PHQ QUESTIONS 1-9: 0
SUM OF ALL RESPONSES TO PHQ QUESTIONS 1-9: 0
SUM OF ALL RESPONSES TO PHQ9 QUESTIONS 1 & 2: 0
SUM OF ALL RESPONSES TO PHQ QUESTIONS 1-9: 0
1. LITTLE INTEREST OR PLEASURE IN DOING THINGS: NOT AT ALL
SUM OF ALL RESPONSES TO PHQ QUESTIONS 1-9: 0
2. FEELING DOWN, DEPRESSED OR HOPELESS: NOT AT ALL

## 2025-01-27 ASSESSMENT — ANXIETY QUESTIONNAIRES
3. WORRYING TOO MUCH ABOUT DIFFERENT THINGS: NOT AT ALL
GAD7 TOTAL SCORE: 0
2. NOT BEING ABLE TO STOP OR CONTROL WORRYING: NOT AT ALL
1. FEELING NERVOUS, ANXIOUS, OR ON EDGE: NOT AT ALL
6. BECOMING EASILY ANNOYED OR IRRITABLE: NOT AT ALL
7. FEELING AFRAID AS IF SOMETHING AWFUL MIGHT HAPPEN: NOT AT ALL
IF YOU CHECKED OFF ANY PROBLEMS ON THIS QUESTIONNAIRE, HOW DIFFICULT HAVE THESE PROBLEMS MADE IT FOR YOU TO DO YOUR WORK, TAKE CARE OF THINGS AT HOME, OR GET ALONG WITH OTHER PEOPLE: NOT DIFFICULT AT ALL
5. BEING SO RESTLESS THAT IT IS HARD TO SIT STILL: NOT AT ALL
4. TROUBLE RELAXING: NOT AT ALL

## 2025-01-27 NOTE — PROGRESS NOTES
\"Have you been to the ER, urgent care clinic since your last visit?  Hospitalized since your last visit?\"    NO    “Have you seen or consulted any other health care providers outside our system since your last visit?”    NO             
Topics Concern    Not on file   Social History Narrative    Not on file     Social Determinants of Health     Financial Resource Strain: Low Risk  (10/17/2024)    Overall Financial Resource Strain (CARDIA)     Difficulty of Paying Living Expenses: Not hard at all   Food Insecurity: No Food Insecurity (2025)    Hunger Vital Sign     Worried About Running Out of Food in the Last Year: Never true     Ran Out of Food in the Last Year: Never true   Transportation Needs: No Transportation Needs (2025)    PRAPARE - Transportation     Lack of Transportation (Medical): No     Lack of Transportation (Non-Medical): No   Physical Activity: Inactive (10/19/2023)    Exercise Vital Sign     Days of Exercise per Week: 0 days     Minutes of Exercise per Session: 0 min   Stress: Not on file   Social Connections: Not on file   Intimate Partner Violence: Not on file   Housing Stability: Low Risk  (2025)    Housing Stability Vital Sign     Unable to Pay for Housing in the Last Year: No     Number of Times Moved in the Last Year: 0     Homeless in the Last Year: No     or  Social History     Tobacco Use   Smoking Status Former   Smokeless Tobacco Former       Family History   Problem Relation Age of Onset    Other Son         low testosterone    Thyroid Cancer Daughter     Other Sister         Keith Jordan    Alzheimer's Disease Mother          at age 92    Lung Disease Father         , smoker--  at 65         HPI:  HPI  Patient is new and here to establish care  Here to establish care.  Has gotten shingles shot.   Has balance problem. Has been doing PT.  Using cane.    Flu  shot received 10 /24. Pna UTD.    ROS:  Review of Systems   All other systems reviewed and are negative.      Physical Exam:  BP (!) 147/83 (Site: Left Upper Arm, Position: Sitting, Cuff Size: Large Adult)   Pulse 75   Temp 98.1 °F (36.7 °C) (Temporal)   Resp 20   Ht 1.727 m (5' 8\")   Wt 98.3 kg (216 lb 12.8 oz)   SpO2 99%

## 2025-01-29 ENCOUNTER — TELEMEDICINE (OUTPATIENT)
Facility: CLINIC | Age: 80
End: 2025-01-29

## 2025-01-29 DIAGNOSIS — Z00.00 INITIAL MEDICARE ANNUAL WELLNESS VISIT: Primary | ICD-10-CM

## 2025-01-29 ASSESSMENT — PATIENT HEALTH QUESTIONNAIRE - PHQ9
SUM OF ALL RESPONSES TO PHQ QUESTIONS 1-9: 0
2. FEELING DOWN, DEPRESSED OR HOPELESS: NOT AT ALL
SUM OF ALL RESPONSES TO PHQ QUESTIONS 1-9: 0
SUM OF ALL RESPONSES TO PHQ9 QUESTIONS 1 & 2: 0
SUM OF ALL RESPONSES TO PHQ QUESTIONS 1-9: 0
SUM OF ALL RESPONSES TO PHQ QUESTIONS 1-9: 0
1. LITTLE INTEREST OR PLEASURE IN DOING THINGS: NOT AT ALL

## 2025-01-29 NOTE — PROGRESS NOTES
\"Have you been to the ER, urgent care clinic since your last visit?  Hospitalized since your last visit?\"    NO    “Have you seen or consulted any other health care providers outside our system since your last visit?”    NO            
  Abnormal BMI (obese):  There is no height or weight on file to calculate BMI. (!) Abnormal    Interventions:  Patient advised to follow-up in this office for further evaluation and treatment               CV Risk Counseling:  Patient was asked about his current diet and exercise habits, and personalized advice was provided regarding recommended lifestyle changes. Patient's individual cardiovascular disease risk factors were discussed, as well as the likely benefits of lifestyle changes. Based upon patient's motivation to change his behavior, the following plan was agreed upon to work toward lowering cardiovascular disease risk: low saturated fat, low cholesterol diet.  Aspirin use for primary prevention of cardiovascular disease for men 45-79 and women 55-79: Not indicated. Educational materials for lifestyle changes were provided. Patient will follow-up in 6 month(s) with PCP. Provider spent 20 minutes counseling patient.            Objective    Patient-Reported Vitals  No data recorded   General Appearance: alert and oriented to person, place and time, well-developed and well-nourished, in no acute distress  Pulmonary/Chest: clear to auscultation bilaterally- no wheezes, rales or rhonchi, normal air movement, no respiratory distress  Cardiovascular: normal rate, normal S1 and S2, no gallops, intact distal pulses, and no carotid bruits          No Known Allergies  Prior to Visit Medications    Medication Sig Taking? Authorizing Provider   losartan (COZAAR) 100 MG tablet Take 1 tablet by mouth daily Yes Yuli New MD   donepezil (ARICEPT) 5 MG tablet Take 1 tablet by mouth nightly Yes Erica Malik MD   rosuvastatin (CRESTOR) 10 MG tablet Take 1 tablet by mouth daily Yes Erica Malik MD   pantoprazole (PROTONIX) 40 MG tablet Take 1 tablet by mouth daily Yes Erica Malik MD   traMADol (ULTRAM) 50 MG tablet Take 1 tablet by mouth every 6 hours as needed for Pain. Yes Provider, MD Sadie

## 2025-06-26 ENCOUNTER — OFFICE VISIT (OUTPATIENT)
Facility: CLINIC | Age: 80
End: 2025-06-26

## 2025-06-26 DIAGNOSIS — I10 ESSENTIAL (PRIMARY) HYPERTENSION: ICD-10-CM

## 2025-06-26 DIAGNOSIS — K21.9 GASTROESOPHAGEAL REFLUX DISEASE WITHOUT ESOPHAGITIS: ICD-10-CM

## 2025-06-26 DIAGNOSIS — R41.3 MEMORY LOSS: ICD-10-CM

## 2025-06-26 DIAGNOSIS — Z13.220 SCREENING, LIPID: ICD-10-CM

## 2025-06-26 DIAGNOSIS — Z13.29 THYROID DISORDER SCREENING: Primary | ICD-10-CM

## 2025-06-26 DIAGNOSIS — E53.8 VITAMIN B12 DEFICIENCY: ICD-10-CM

## 2025-06-26 DIAGNOSIS — R53.83 FATIGUE, UNSPECIFIED TYPE: ICD-10-CM

## 2025-06-26 DIAGNOSIS — Z13.1 SCREENING FOR DIABETES MELLITUS (DM): ICD-10-CM

## 2025-06-26 DIAGNOSIS — M48.061 SPINAL STENOSIS OF LUMBAR REGION, UNSPECIFIED WHETHER NEUROGENIC CLAUDICATION PRESENT: ICD-10-CM

## 2025-06-30 RX ORDER — LOSARTAN POTASSIUM 100 MG/1
100 TABLET ORAL DAILY
Qty: 90 TABLET | Refills: 1 | Status: SHIPPED | OUTPATIENT
Start: 2025-06-30

## 2025-06-30 RX ORDER — PANTOPRAZOLE SODIUM 20 MG/1
20 TABLET, DELAYED RELEASE ORAL
Qty: 90 TABLET | Refills: 1 | Status: SHIPPED | OUTPATIENT
Start: 2025-06-30

## 2025-06-30 RX ORDER — GABAPENTIN 300 MG/1
CAPSULE ORAL
Qty: 90 CAPSULE | Refills: 1 | Status: SHIPPED | OUTPATIENT
Start: 2025-06-30 | End: 2025-09-23

## 2025-06-30 RX ORDER — DONEPEZIL HYDROCHLORIDE 5 MG/1
5 TABLET, FILM COATED ORAL NIGHTLY
Qty: 90 TABLET | Refills: 0 | Status: SHIPPED | OUTPATIENT
Start: 2025-06-30

## 2025-07-01 NOTE — PROGRESS NOTES
Yobani Peterson is a 80 y.o. male who presents to the office today for the following:  No chief complaint on file.      No Known Allergies      Current Outpatient Medications:   •  losartan (COZAAR) 100 MG tablet, Take 1 tablet by mouth daily, Disp: 90 tablet, Rfl: 1  •  donepezil (ARICEPT) 5 MG tablet, Take 1 tablet by mouth nightly, Disp: 90 tablet, Rfl: 0  •  rosuvastatin (CRESTOR) 10 MG tablet, Take 1 tablet by mouth daily, Disp: 90 tablet, Rfl: 2  •  pantoprazole (PROTONIX) 40 MG tablet, Take 1 tablet by mouth daily, Disp: 90 tablet, Rfl: 2  •  gabapentin (NEURONTIN) 300 MG capsule, 1 po q HS, Disp: 90 capsule, Rfl: 1  •  traMADol (ULTRAM) 50 MG tablet, Take 1 tablet by mouth every 6 hours as needed for Pain., Disp: , Rfl:   •  acetaminophen (TYLENOL) 650 MG extended release tablet, Take 1 tablet by mouth in the morning and 1 tablet at noon and 1 tablet in the evening., Disp: , Rfl:   •  Cholecalciferol 50 MCG (2000 UT) CAPS, Take by mouth daily, Disp: , Rfl:   •  cyanocobalamin 1000 MCG tablet, Take 2.5 tablets by mouth daily, Disp: , Rfl:   •  ibuprofen (ADVIL;MOTRIN) 200 MG tablet, Take by mouth, Disp: , Rfl:       Past Medical History:   Diagnosis Date   • GERD (gastroesophageal reflux disease)    • HLD (hyperlipidemia)    • Hypertension    • Tubular adenoma of colon        Past Surgical History:   Procedure Laterality Date   • CHOLECYSTECTOMY, LAPAROSCOPIC     • NEUROLOGICAL SURGERY      cyst removed from lower back above tail bone   • TOTAL KNEE ARTHROPLASTY Left        Social History     Socioeconomic History   • Marital status:      Spouse name: Not on file   • Number of children: Not on file   • Years of education: Not on file   • Highest education level: Not on file   Occupational History   • Not on file   Tobacco Use   • Smoking status: Former   • Smokeless tobacco: Former   Substance and Sexual Activity   • Alcohol use: Yes   • Drug use: No   • Sexual activity: Not on file   Other Topics

## 2025-07-01 NOTE — PROGRESS NOTES
zee  Yobani Peterson is a 80 y.o. male who presents to the office today for the following:  No chief complaint on file.      No Known Allergies      Current Outpatient Medications:     donepezil (ARICEPT) 5 MG tablet, Take 1 tablet by mouth nightly, Disp: 90 tablet, Rfl: 0    pantoprazole (PROTONIX) 20 MG tablet, Take 1 tablet by mouth every morning (before breakfast), Disp: 90 tablet, Rfl: 1    losartan (COZAAR) 100 MG tablet, Take 1 tablet by mouth daily, Disp: 90 tablet, Rfl: 1    gabapentin (NEURONTIN) 300 MG capsule, 1 po q HS, Disp: 90 capsule, Rfl: 1    rosuvastatin (CRESTOR) 10 MG tablet, Take 1 tablet by mouth daily, Disp: 90 tablet, Rfl: 2    traMADol (ULTRAM) 50 MG tablet, Take 1 tablet by mouth every 6 hours as needed for Pain., Disp: , Rfl:     acetaminophen (TYLENOL) 650 MG extended release tablet, Take 1 tablet by mouth in the morning and 1 tablet at noon and 1 tablet in the evening., Disp: , Rfl:     Cholecalciferol 50 MCG (2000 UT) CAPS, Take by mouth daily, Disp: , Rfl:     cyanocobalamin 1000 MCG tablet, Take 2.5 tablets by mouth daily, Disp: , Rfl:     ibuprofen (ADVIL;MOTRIN) 200 MG tablet, Take by mouth, Disp: , Rfl:       Past Medical History:   Diagnosis Date    GERD (gastroesophageal reflux disease)     HLD (hyperlipidemia)     Hypertension     Tubular adenoma of colon        Past Surgical History:   Procedure Laterality Date    CHOLECYSTECTOMY, LAPAROSCOPIC      NEUROLOGICAL SURGERY      cyst removed from lower back above tail bone    TOTAL KNEE ARTHROPLASTY Left        Social History     Socioeconomic History    Marital status:      Spouse name: Not on file    Number of children: Not on file    Years of education: Not on file    Highest education level: Not on file   Occupational History    Not on file   Tobacco Use    Smoking status: Former    Smokeless tobacco: Former   Substance and Sexual Activity    Alcohol use: Yes    Drug use: No    Sexual activity: Not on file   Other Topics

## 2025-07-02 ENCOUNTER — TELEPHONE (OUTPATIENT)
Facility: CLINIC | Age: 80
End: 2025-07-02

## 2025-08-05 ENCOUNTER — HOSPITAL ENCOUNTER (OUTPATIENT)
Facility: HOSPITAL | Age: 80
Setting detail: SPECIMEN
Discharge: HOME | End: 2025-08-08
Payer: MEDICARE

## 2025-08-05 DIAGNOSIS — E53.8 VITAMIN B12 DEFICIENCY: ICD-10-CM

## 2025-08-05 DIAGNOSIS — Z13.1 SCREENING FOR DIABETES MELLITUS (DM): ICD-10-CM

## 2025-08-05 DIAGNOSIS — R53.83 FATIGUE, UNSPECIFIED TYPE: ICD-10-CM

## 2025-08-05 DIAGNOSIS — Z13.29 THYROID DISORDER SCREENING: ICD-10-CM

## 2025-08-05 DIAGNOSIS — Z13.220 SCREENING, LIPID: ICD-10-CM

## 2025-08-05 LAB
ALBUMIN SERPL-MCNC: 3.9 G/DL (ref 3.4–5)
ALBUMIN/GLOB SERPL: 1.4 (ref 0.8–1.7)
ALP SERPL-CCNC: 100 U/L (ref 45–117)
ALT SERPL-CCNC: 28 U/L (ref 10–50)
ANION GAP SERPL CALC-SCNC: 16 MMOL/L (ref 3–18)
AST SERPL-CCNC: 29 U/L (ref 10–38)
BILIRUB SERPL-MCNC: 1 MG/DL (ref 0.2–1)
BUN SERPL-MCNC: 10 MG/DL (ref 6–23)
BUN/CREAT SERPL: 10 (ref 12–20)
CALCIUM SERPL-MCNC: 9.5 MG/DL (ref 8.5–10.1)
CHLORIDE SERPL-SCNC: 102 MMOL/L (ref 98–107)
CHOLEST SERPL-MCNC: 200 MG/DL
CO2 SERPL-SCNC: 24 MMOL/L (ref 21–32)
CREAT SERPL-MCNC: 1.01 MG/DL (ref 0.6–1.3)
ERYTHROCYTE [DISTWIDTH] IN BLOOD BY AUTOMATED COUNT: 12.2 % (ref 11.6–14.5)
EST. AVERAGE GLUCOSE BLD GHB EST-MCNC: 114 MG/DL
FOLATE SERPL-MCNC: 6.6 NG/ML (ref 4.6–34.8)
GLOBULIN SER CALC-MCNC: 2.8 G/DL (ref 2–4)
GLUCOSE SERPL-MCNC: 106 MG/DL (ref 74–108)
HBA1C MFR BLD: 5.6 % (ref 4.2–5.6)
HCT VFR BLD AUTO: 46.8 % (ref 36–48)
HDLC SERPL-MCNC: 99 MG/DL (ref 40–60)
HDLC SERPL: 2 (ref 0–5)
HGB BLD-MCNC: 15.7 G/DL (ref 13–16)
LDLC SERPL CALC-MCNC: 85 MG/DL (ref 0–100)
MCH RBC QN AUTO: 34.1 PG (ref 24–34)
MCHC RBC AUTO-ENTMCNC: 33.5 G/DL (ref 31–37)
MCV RBC AUTO: 101.7 FL (ref 78–100)
NRBC # BLD: 0 K/UL (ref 0–0.01)
NRBC BLD-RTO: 0 PER 100 WBC
PLATELET # BLD AUTO: 178 K/UL (ref 135–420)
PMV BLD AUTO: 10.9 FL (ref 9.2–11.8)
POTASSIUM SERPL-SCNC: 4.4 MMOL/L (ref 3.5–5.5)
PROT SERPL-MCNC: 6.8 G/DL (ref 6.4–8.2)
RBC # BLD AUTO: 4.6 M/UL (ref 4.35–5.65)
SODIUM SERPL-SCNC: 142 MMOL/L (ref 136–145)
TRIGL SERPL-MCNC: 78 MG/DL (ref 0–150)
TSH, 3RD GENERATION: 1.37 UIU/ML (ref 0.27–4.2)
VIT B12 SERPL-MCNC: 724 PG/ML (ref 211–911)
VLDLC SERPL CALC-MCNC: 16 MG/DL
WBC # BLD AUTO: 5.4 K/UL (ref 4.6–13.2)

## 2025-08-05 PROCEDURE — 85027 COMPLETE CBC AUTOMATED: CPT

## 2025-08-05 PROCEDURE — 36415 COLL VENOUS BLD VENIPUNCTURE: CPT

## 2025-08-05 PROCEDURE — 82746 ASSAY OF FOLIC ACID SERUM: CPT

## 2025-08-05 PROCEDURE — 80061 LIPID PANEL: CPT

## 2025-08-05 PROCEDURE — 83036 HEMOGLOBIN GLYCOSYLATED A1C: CPT

## 2025-08-05 PROCEDURE — 82607 VITAMIN B-12: CPT

## 2025-08-05 PROCEDURE — 84443 ASSAY THYROID STIM HORMONE: CPT

## 2025-08-05 PROCEDURE — 80053 COMPREHEN METABOLIC PANEL: CPT

## 2025-08-11 DIAGNOSIS — S42.009S CLOSED NONDISPLACED FRACTURE OF CLAVICLE, UNSPECIFIED LATERALITY, UNSPECIFIED PART OF CLAVICLE, SEQUELA: Primary | ICD-10-CM

## 2025-08-11 RX ORDER — TRAMADOL HYDROCHLORIDE 50 MG/1
50 TABLET ORAL EVERY 6 HOURS PRN
Qty: 30 TABLET | Refills: 0 | Status: SHIPPED | OUTPATIENT
Start: 2025-08-11 | End: 2025-08-18